# Patient Record
Sex: FEMALE | URBAN - METROPOLITAN AREA
[De-identification: names, ages, dates, MRNs, and addresses within clinical notes are randomized per-mention and may not be internally consistent; named-entity substitution may affect disease eponyms.]

---

## 2017-01-01 ENCOUNTER — EMERGENCY (EMERGENCY)
Facility: HOSPITAL | Age: 72
LOS: 1 days | Discharge: HOME | End: 2017-01-01
Attending: EMERGENCY MEDICINE
Payer: MEDICAID

## 2017-01-01 VITALS
WEIGHT: 191.8 LBS | OXYGEN SATURATION: 98 % | HEIGHT: 57 IN | DIASTOLIC BLOOD PRESSURE: 70 MMHG | RESPIRATION RATE: 16 BRPM | TEMPERATURE: 208 F | SYSTOLIC BLOOD PRESSURE: 125 MMHG | HEART RATE: 78 BPM

## 2017-01-01 VITALS
SYSTOLIC BLOOD PRESSURE: 130 MMHG | RESPIRATION RATE: 17 BRPM | OXYGEN SATURATION: 98 % | TEMPERATURE: 98 F | HEART RATE: 72 BPM | DIASTOLIC BLOOD PRESSURE: 72 MMHG

## 2017-01-01 DIAGNOSIS — Z98.890 OTHER SPECIFIED POSTPROCEDURAL STATES: ICD-10-CM

## 2017-01-01 DIAGNOSIS — I10 ESSENTIAL (PRIMARY) HYPERTENSION: ICD-10-CM

## 2017-01-01 DIAGNOSIS — R19.7 DIARRHEA, UNSPECIFIED: ICD-10-CM

## 2017-01-01 DIAGNOSIS — E78.5 HYPERLIPIDEMIA, UNSPECIFIED: ICD-10-CM

## 2017-01-01 DIAGNOSIS — Z96.651 PRESENCE OF RIGHT ARTIFICIAL KNEE JOINT: ICD-10-CM

## 2017-01-01 DIAGNOSIS — E11.9 TYPE 2 DIABETES MELLITUS WITHOUT COMPLICATIONS: ICD-10-CM

## 2017-01-01 LAB
ALBUMIN SERPL ELPH-MCNC: 3.4 G/DL — LOW (ref 3.5–5)
ALP SERPL-CCNC: 88 U/L — SIGNIFICANT CHANGE UP (ref 40–120)
ALT FLD-CCNC: 21 U/L DA — SIGNIFICANT CHANGE UP (ref 10–60)
AMYLASE P1 CFR SERPL: 41 U/L — SIGNIFICANT CHANGE UP (ref 25–115)
ANION GAP SERPL CALC-SCNC: 7 MMOL/L — SIGNIFICANT CHANGE UP (ref 5–17)
APPEARANCE UR: CLEAR — SIGNIFICANT CHANGE UP
APTT BLD: 33.4 SEC — SIGNIFICANT CHANGE UP (ref 27.5–37.4)
AST SERPL-CCNC: 23 U/L — SIGNIFICANT CHANGE UP (ref 10–40)
BACTERIA # UR AUTO: ABNORMAL /HPF
BASOPHILS # BLD AUTO: 0.1 K/UL — SIGNIFICANT CHANGE UP (ref 0–0.2)
BASOPHILS NFR BLD AUTO: 1.1 % — SIGNIFICANT CHANGE UP (ref 0–2)
BILIRUB SERPL-MCNC: 0.3 MG/DL — SIGNIFICANT CHANGE UP (ref 0.2–1.2)
BILIRUB UR-MCNC: NEGATIVE — SIGNIFICANT CHANGE UP
BLD GP AB SCN SERPL QL: SIGNIFICANT CHANGE UP
BUN SERPL-MCNC: 29 MG/DL — HIGH (ref 7–18)
CALCIUM SERPL-MCNC: 8.5 MG/DL — SIGNIFICANT CHANGE UP (ref 8.4–10.5)
CHLORIDE SERPL-SCNC: 105 MMOL/L — SIGNIFICANT CHANGE UP (ref 96–108)
CO2 SERPL-SCNC: 28 MMOL/L — SIGNIFICANT CHANGE UP (ref 22–31)
COLOR SPEC: YELLOW — SIGNIFICANT CHANGE UP
CREAT SERPL-MCNC: 1.48 MG/DL — HIGH (ref 0.5–1.3)
DIFF PNL FLD: ABNORMAL
EOSINOPHIL # BLD AUTO: 0 K/UL — SIGNIFICANT CHANGE UP (ref 0–0.5)
EOSINOPHIL NFR BLD AUTO: 0.2 % — SIGNIFICANT CHANGE UP (ref 0–6)
EPI CELLS # UR: ABNORMAL (ref 0–10)
GLUCOSE SERPL-MCNC: 100 MG/DL — HIGH (ref 70–99)
GLUCOSE UR QL: NEGATIVE — SIGNIFICANT CHANGE UP
HCT VFR BLD CALC: 39.3 % — SIGNIFICANT CHANGE UP (ref 34.5–45)
HGB BLD-MCNC: 13.5 G/DL — SIGNIFICANT CHANGE UP (ref 11.5–15.5)
INR BLD: 1.12 RATIO — SIGNIFICANT CHANGE UP (ref 0.88–1.16)
KETONES UR-MCNC: NEGATIVE — SIGNIFICANT CHANGE UP
LACTATE SERPL-SCNC: 0.9 MMOL/L — SIGNIFICANT CHANGE UP (ref 0.7–2)
LEUKOCYTE ESTERASE UR-ACNC: ABNORMAL
LIDOCAIN IGE QN: 103 U/L — SIGNIFICANT CHANGE UP (ref 73–393)
LYMPHOCYTES # BLD AUTO: 0.9 K/UL — LOW (ref 1–3.3)
LYMPHOCYTES # BLD AUTO: 11.1 % — LOW (ref 13–44)
MAGNESIUM SERPL-MCNC: 1.4 MG/DL — LOW (ref 1.8–2.4)
MCHC RBC-ENTMCNC: 32.2 PG — SIGNIFICANT CHANGE UP (ref 27–34)
MCHC RBC-ENTMCNC: 34.4 GM/DL — SIGNIFICANT CHANGE UP (ref 32–36)
MCV RBC AUTO: 93.8 FL — SIGNIFICANT CHANGE UP (ref 80–100)
MONOCYTES # BLD AUTO: 0.8 K/UL — SIGNIFICANT CHANGE UP (ref 0–0.9)
MONOCYTES NFR BLD AUTO: 9.2 % — SIGNIFICANT CHANGE UP (ref 2–14)
NEUTROPHILS # BLD AUTO: 6.5 K/UL — SIGNIFICANT CHANGE UP (ref 1.8–7.4)
NEUTROPHILS NFR BLD AUTO: 78.4 % — HIGH (ref 43–77)
NITRITE UR-MCNC: NEGATIVE — SIGNIFICANT CHANGE UP
OB PNL STL: NEGATIVE — SIGNIFICANT CHANGE UP
PH UR: 6 — SIGNIFICANT CHANGE UP (ref 4.8–8)
PLATELET # BLD AUTO: 190 K/UL — SIGNIFICANT CHANGE UP (ref 150–400)
POTASSIUM SERPL-MCNC: 3.5 MMOL/L — SIGNIFICANT CHANGE UP (ref 3.5–5.3)
POTASSIUM SERPL-SCNC: 3.5 MMOL/L — SIGNIFICANT CHANGE UP (ref 3.5–5.3)
PROT SERPL-MCNC: 7.2 G/DL — SIGNIFICANT CHANGE UP (ref 6–8.3)
PROT UR-MCNC: 15
PROTHROM AB SERPL-ACNC: 12.6 SEC — SIGNIFICANT CHANGE UP (ref 10–13.1)
RBC # BLD: 4.2 M/UL — SIGNIFICANT CHANGE UP (ref 3.8–5.2)
RBC # FLD: 11.6 % — SIGNIFICANT CHANGE UP (ref 10.3–14.5)
RBC CASTS # UR COMP ASSIST: SIGNIFICANT CHANGE UP /HPF (ref 0–2)
SODIUM SERPL-SCNC: 140 MMOL/L — SIGNIFICANT CHANGE UP (ref 135–145)
SP GR SPEC: 1.01 — SIGNIFICANT CHANGE UP (ref 1.01–1.02)
TROPONIN I SERPL-MCNC: <0.015 NG/ML — SIGNIFICANT CHANGE UP (ref 0–0.04)
UROBILINOGEN FLD QL: NEGATIVE — SIGNIFICANT CHANGE UP
WBC # BLD: 8.3 K/UL — SIGNIFICANT CHANGE UP (ref 3.8–10.5)
WBC # FLD AUTO: 8.3 K/UL — SIGNIFICANT CHANGE UP (ref 3.8–10.5)
WBC UR QL: ABNORMAL /HPF (ref 0–5)

## 2017-01-01 PROCEDURE — 83605 ASSAY OF LACTIC ACID: CPT

## 2017-01-01 PROCEDURE — 82150 ASSAY OF AMYLASE: CPT

## 2017-01-01 PROCEDURE — 86901 BLOOD TYPING SEROLOGIC RH(D): CPT

## 2017-01-01 PROCEDURE — 99284 EMERGENCY DEPT VISIT MOD MDM: CPT

## 2017-01-01 PROCEDURE — 81001 URINALYSIS AUTO W/SCOPE: CPT

## 2017-01-01 PROCEDURE — 85610 PROTHROMBIN TIME: CPT

## 2017-01-01 PROCEDURE — 74176 CT ABD & PELVIS W/O CONTRAST: CPT

## 2017-01-01 PROCEDURE — 86850 RBC ANTIBODY SCREEN: CPT

## 2017-01-01 PROCEDURE — 74176 CT ABD & PELVIS W/O CONTRAST: CPT | Mod: 26

## 2017-01-01 PROCEDURE — 85730 THROMBOPLASTIN TIME PARTIAL: CPT

## 2017-01-01 PROCEDURE — 36000 PLACE NEEDLE IN VEIN: CPT

## 2017-01-01 PROCEDURE — 85027 COMPLETE CBC AUTOMATED: CPT

## 2017-01-01 PROCEDURE — 83690 ASSAY OF LIPASE: CPT

## 2017-01-01 PROCEDURE — 84484 ASSAY OF TROPONIN QUANT: CPT

## 2017-01-01 PROCEDURE — 80053 COMPREHEN METABOLIC PANEL: CPT

## 2017-01-01 PROCEDURE — 83735 ASSAY OF MAGNESIUM: CPT

## 2017-01-01 PROCEDURE — 82272 OCCULT BLD FECES 1-3 TESTS: CPT

## 2017-01-01 RX ORDER — SODIUM CHLORIDE 9 MG/ML
3 INJECTION INTRAMUSCULAR; INTRAVENOUS; SUBCUTANEOUS ONCE
Qty: 0 | Refills: 0 | Status: COMPLETED | OUTPATIENT
Start: 2017-01-01 | End: 2017-01-01

## 2017-01-01 RX ORDER — LOPERAMIDE HCL 2 MG
4 TABLET ORAL ONCE
Qty: 0 | Refills: 0 | Status: COMPLETED | OUTPATIENT
Start: 2017-01-01 | End: 2017-01-01

## 2017-01-01 RX ORDER — SODIUM CHLORIDE 9 MG/ML
1000 INJECTION INTRAMUSCULAR; INTRAVENOUS; SUBCUTANEOUS
Qty: 0 | Refills: 0 | Status: DISCONTINUED | OUTPATIENT
Start: 2017-01-01 | End: 2017-01-05

## 2017-01-01 RX ORDER — LOPERAMIDE HCL 2 MG
1 TABLET ORAL
Qty: 18 | Refills: 0 | OUTPATIENT
Start: 2017-01-01 | End: 2017-01-04

## 2017-01-01 RX ADMIN — Medication 4 MILLIGRAM(S): at 16:14

## 2017-01-01 RX ADMIN — SODIUM CHLORIDE 125 MILLILITER(S): 9 INJECTION INTRAMUSCULAR; INTRAVENOUS; SUBCUTANEOUS at 13:39

## 2017-01-01 RX ADMIN — SODIUM CHLORIDE 3 MILLILITER(S): 9 INJECTION INTRAMUSCULAR; INTRAVENOUS; SUBCUTANEOUS at 13:39

## 2017-01-01 NOTE — ED PROVIDER NOTE - OBJECTIVE STATEMENT
70 y/o F w/ PMHx of HTN and DM p/w diffuse abd pain and dark diarrhea onset 2 days. Pt recently started taking Motrin for body aches x1 week. Pt denies fever, recent Abx, sick contacts, recent travel, or any other complaint. NKDA.

## 2017-01-01 NOTE — ED PROVIDER NOTE - NS ED MD SCRIBE ATTENDING SCRIBE SECTIONS
PHYSICAL EXAM/HISTORY OF PRESENT ILLNESS/REVIEW OF SYSTEMS/HIV/DISPOSITION/VITAL SIGNS( Pullset)/PAST MEDICAL/SURGICAL/SOCIAL HISTORY

## 2017-01-01 NOTE — ED PROVIDER NOTE - PROGRESS NOTE DETAILS
Pt and family educated on results. Pt reports diarrhea improved. Denies any abd pain. Ate meal w/o difficulty. Wants to go home. Tolerating PO w/o difficulty. Agrees to return for worsening diarrhea, dizziness, pain or any other concerns. Received total 2 liters of fluid.

## 2017-01-01 NOTE — ED PROVIDER NOTE - PMH
Arthritis    Depression    Diabetes    Hyperlipidemia    Hypertension    Insomnia    Obesity (BMI 30-39.9)

## 2017-01-01 NOTE — ED PROVIDER NOTE - PSH
H/O total knee replacement, right  2014 August 12  S/P bilateral cataract extraction    S/P vaginopexy

## 2017-01-01 NOTE — ED ADULT NURSE NOTE - OBJECTIVE STATEMENT
covering rn notes covering for rn agatha: Received pt from triage with c/o abd. pain and diarrhea x 3 days. pt is a/o x 3, cooperative. Denies SOB or chest pain. Awaiting to be seen by MD. Will continue to monitor.

## 2017-09-11 ENCOUNTER — EMERGENCY (EMERGENCY)
Facility: HOSPITAL | Age: 72
LOS: 1 days | Discharge: HOME | End: 2017-09-11
Attending: EMERGENCY MEDICINE
Payer: MEDICAID

## 2017-09-11 VITALS
RESPIRATION RATE: 18 BRPM | WEIGHT: 190.04 LBS | DIASTOLIC BLOOD PRESSURE: 103 MMHG | HEART RATE: 62 BPM | OXYGEN SATURATION: 99 % | HEIGHT: 61 IN | SYSTOLIC BLOOD PRESSURE: 160 MMHG

## 2017-09-11 VITALS
SYSTOLIC BLOOD PRESSURE: 154 MMHG | OXYGEN SATURATION: 99 % | DIASTOLIC BLOOD PRESSURE: 89 MMHG | RESPIRATION RATE: 20 BRPM | HEART RATE: 58 BPM

## 2017-09-11 PROCEDURE — 73562 X-RAY EXAM OF KNEE 3: CPT | Mod: 26,RT

## 2017-09-11 PROCEDURE — 73562 X-RAY EXAM OF KNEE 3: CPT

## 2017-09-11 PROCEDURE — 99284 EMERGENCY DEPT VISIT MOD MDM: CPT

## 2017-09-11 PROCEDURE — 99283 EMERGENCY DEPT VISIT LOW MDM: CPT | Mod: 25

## 2017-09-11 RX ORDER — OXYCODONE AND ACETAMINOPHEN 5; 325 MG/1; MG/1
1 TABLET ORAL ONCE
Qty: 0 | Refills: 0 | Status: DISCONTINUED | OUTPATIENT
Start: 2017-09-11 | End: 2017-09-11

## 2017-09-11 RX ADMIN — OXYCODONE AND ACETAMINOPHEN 1 TABLET(S): 5; 325 TABLET ORAL at 10:24

## 2017-09-11 NOTE — ED PROVIDER NOTE - OBJECTIVE STATEMENT
71 y/o F pt with PMHx of Arthritis, Depression, DM, HLD, HTN, Insomnia, and Obesity and PSHx of Vaginopexy, b/l Cataract Extraction, and TKR (R) presents to ED c/o R knee pain since yesterday. Pt also reports associated difficulty ambulating, prompting pt to visit ED for evaluation. Pt denies numbness, tingling, or any other complaints. Pt also denies recent trauma to R knee. NKDA.

## 2017-09-15 DIAGNOSIS — E11.9 TYPE 2 DIABETES MELLITUS WITHOUT COMPLICATIONS: ICD-10-CM

## 2017-09-15 DIAGNOSIS — G47.00 INSOMNIA, UNSPECIFIED: ICD-10-CM

## 2017-09-15 DIAGNOSIS — F32.89 OTHER SPECIFIED DEPRESSIVE EPISODES: ICD-10-CM
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
## 2017-09-15 DIAGNOSIS — Z96.651 PRESENCE OF RIGHT ARTIFICIAL KNEE JOINT: ICD-10-CM

## 2017-09-15 DIAGNOSIS — M19.90 UNSPECIFIED OSTEOARTHRITIS, UNSPECIFIED SITE: ICD-10-CM

## 2017-09-15 DIAGNOSIS — S83.8X1A SPRAIN OF OTHER SPECIFIED PARTS OF RIGHT KNEE, INITIAL ENCOUNTER: ICD-10-CM
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
## 2017-09-15 DIAGNOSIS — E78.5 HYPERLIPIDEMIA, UNSPECIFIED: ICD-10-CM

## 2017-09-15 DIAGNOSIS — E66.9 OBESITY, UNSPECIFIED: ICD-10-CM

## 2017-09-15 DIAGNOSIS — I10 ESSENTIAL (PRIMARY) HYPERTENSION: ICD-10-CM

## 2017-10-11 ENCOUNTER — OUTPATIENT (OUTPATIENT)
Dept: OUTPATIENT SERVICES | Facility: HOSPITAL | Age: 72
LOS: 1 days | End: 2017-10-11
Payer: MEDICAID

## 2017-10-11 VITALS
HEART RATE: 78 BPM | WEIGHT: 199.96 LBS | OXYGEN SATURATION: 98 % | SYSTOLIC BLOOD PRESSURE: 150 MMHG | TEMPERATURE: 99 F | DIASTOLIC BLOOD PRESSURE: 80 MMHG | RESPIRATION RATE: 18 BRPM | HEIGHT: 56 IN

## 2017-10-11 DIAGNOSIS — M17.12 UNILATERAL PRIMARY OSTEOARTHRITIS, LEFT KNEE: ICD-10-CM

## 2017-10-11 DIAGNOSIS — G47.33 OBSTRUCTIVE SLEEP APNEA (ADULT) (PEDIATRIC): ICD-10-CM

## 2017-10-11 DIAGNOSIS — I10 ESSENTIAL (PRIMARY) HYPERTENSION: ICD-10-CM

## 2017-10-11 DIAGNOSIS — E11.9 TYPE 2 DIABETES MELLITUS WITHOUT COMPLICATIONS: ICD-10-CM

## 2017-10-11 DIAGNOSIS — Z01.818 ENCOUNTER FOR OTHER PREPROCEDURAL EXAMINATION: ICD-10-CM

## 2017-10-11 LAB
ANION GAP SERPL CALC-SCNC: 7 MMOL/L — SIGNIFICANT CHANGE UP (ref 5–17)
APTT BLD: 34.4 SEC — SIGNIFICANT CHANGE UP (ref 27.5–37.4)
BUN SERPL-MCNC: 28 MG/DL — HIGH (ref 7–18)
CALCIUM SERPL-MCNC: 9.9 MG/DL — SIGNIFICANT CHANGE UP (ref 8.4–10.5)
CHLORIDE SERPL-SCNC: 108 MMOL/L — SIGNIFICANT CHANGE UP (ref 96–108)
CO2 SERPL-SCNC: 25 MMOL/L — SIGNIFICANT CHANGE UP (ref 22–31)
CREAT SERPL-MCNC: 1.21 MG/DL — SIGNIFICANT CHANGE UP (ref 0.5–1.3)
GLUCOSE SERPL-MCNC: 113 MG/DL — HIGH (ref 70–99)
HBA1C BLD-MCNC: 6.1 % — HIGH (ref 4–5.6)
HCT VFR BLD CALC: 41.2 % — SIGNIFICANT CHANGE UP (ref 34.5–45)
HGB BLD-MCNC: 13.6 G/DL — SIGNIFICANT CHANGE UP (ref 11.5–15.5)
INR BLD: 0.99 RATIO — SIGNIFICANT CHANGE UP (ref 0.88–1.16)
MCHC RBC-ENTMCNC: 32.7 PG — SIGNIFICANT CHANGE UP (ref 27–34)
MCHC RBC-ENTMCNC: 33 GM/DL — SIGNIFICANT CHANGE UP (ref 32–36)
MCV RBC AUTO: 99.3 FL — SIGNIFICANT CHANGE UP (ref 80–100)
PLATELET # BLD AUTO: 240 K/UL — SIGNIFICANT CHANGE UP (ref 150–400)
POTASSIUM SERPL-MCNC: 3.9 MMOL/L — SIGNIFICANT CHANGE UP (ref 3.5–5.3)
POTASSIUM SERPL-SCNC: 3.9 MMOL/L — SIGNIFICANT CHANGE UP (ref 3.5–5.3)
PROTHROM AB SERPL-ACNC: 10.8 SEC — SIGNIFICANT CHANGE UP (ref 9.8–12.7)
RBC # BLD: 4.15 M/UL — SIGNIFICANT CHANGE UP (ref 3.8–5.2)
RBC # FLD: 11.6 % — SIGNIFICANT CHANGE UP (ref 10.3–14.5)
SODIUM SERPL-SCNC: 140 MMOL/L — SIGNIFICANT CHANGE UP (ref 135–145)
WBC # BLD: 5.6 K/UL — SIGNIFICANT CHANGE UP (ref 3.8–10.5)
WBC # FLD AUTO: 5.6 K/UL — SIGNIFICANT CHANGE UP (ref 3.8–10.5)

## 2017-10-11 PROCEDURE — 71020: CPT | Mod: 26

## 2017-10-11 PROCEDURE — G0463: CPT

## 2017-10-11 PROCEDURE — 83036 HEMOGLOBIN GLYCOSYLATED A1C: CPT

## 2017-10-11 PROCEDURE — 87640 STAPH A DNA AMP PROBE: CPT

## 2017-10-11 PROCEDURE — 85730 THROMBOPLASTIN TIME PARTIAL: CPT

## 2017-10-11 PROCEDURE — 85027 COMPLETE CBC AUTOMATED: CPT

## 2017-10-11 PROCEDURE — 86900 BLOOD TYPING SEROLOGIC ABO: CPT

## 2017-10-11 PROCEDURE — 85610 PROTHROMBIN TIME: CPT

## 2017-10-11 PROCEDURE — 71046 X-RAY EXAM CHEST 2 VIEWS: CPT

## 2017-10-11 PROCEDURE — 80048 BASIC METABOLIC PNL TOTAL CA: CPT

## 2017-10-11 PROCEDURE — 87641 MR-STAPH DNA AMP PROBE: CPT

## 2017-10-11 PROCEDURE — 93005 ELECTROCARDIOGRAM TRACING: CPT

## 2017-10-11 PROCEDURE — 86850 RBC ANTIBODY SCREEN: CPT

## 2017-10-11 PROCEDURE — 86901 BLOOD TYPING SEROLOGIC RH(D): CPT

## 2017-10-11 RX ORDER — HYDROXYZINE HCL 10 MG
1 TABLET ORAL
Qty: 0 | Refills: 0 | COMMUNITY

## 2017-10-11 NOTE — H&P PST ADULT - PMH
Arthritis    CVA (cerebral vascular accident)  facial weakness  2011  Depression    Diabetes  x 4 years, controlled  Hyperlipidemia    Hypertension  x 25 years, controlled  Hypothyroid    Insomnia    OA (osteoarthritis)    Obesity (BMI 30-39.9) Arthritis    CVA (cerebral vascular accident)  facial weakness  2011  Depression    Diabetes  x 4 years, controlled  Hyperlipidemia    Hypertension  x 25 years, controlled  Hypothyroid    Insomnia    Morbid obesity with BMI of 40.0-44.9, adult    OA (osteoarthritis)    CHRISTOPHE (obstructive sleep apnea)  by criteria

## 2017-10-11 NOTE — H&P PST ADULT - NSANTHOSAYNRD_GEN_A_CORE
No. CHRISTOPHE screening performed.  STOP BANG Legend: 0-2 = LOW Risk; 3-4 = INTERMEDIATE Risk; 5-8 = HIGH Risk

## 2017-10-11 NOTE — H&P PST ADULT - FAMILY HISTORY
Mother  Still living? No  Family history of essential hypertension, Age at diagnosis: Age Unknown  Family history of heart disease, Age at diagnosis: Age Unknown     Father  Still living? Unknown  Family history of heart disease, Age at diagnosis: Age Unknown

## 2017-10-11 NOTE — H&P PST ADULT - HISTORY OF PRESENT ILLNESS
This is a 73 y/o female c/o pain left knee associated with difficulty walking, Xray revealed osteoarthritis, she presents today for surgery

## 2017-10-12 LAB
MRSA PCR RESULT.: SIGNIFICANT CHANGE UP
S AUREUS DNA NOSE QL NAA+PROBE: SIGNIFICANT CHANGE UP

## 2017-10-24 ENCOUNTER — INPATIENT (INPATIENT)
Facility: HOSPITAL | Age: 72
LOS: 3 days | Discharge: HOME HEALTH | DRG: 464 | End: 2017-10-28
Attending: ORTHOPAEDIC SURGERY | Admitting: ORTHOPAEDIC SURGERY
Payer: MEDICAID

## 2017-10-24 VITALS
RESPIRATION RATE: 18 BRPM | HEIGHT: 56 IN | DIASTOLIC BLOOD PRESSURE: 76 MMHG | WEIGHT: 199.96 LBS | HEART RATE: 65 BPM | TEMPERATURE: 98 F | OXYGEN SATURATION: 97 % | SYSTOLIC BLOOD PRESSURE: 141 MMHG

## 2017-10-24 DIAGNOSIS — M17.12 UNILATERAL PRIMARY OSTEOARTHRITIS, LEFT KNEE: ICD-10-CM

## 2017-10-24 DIAGNOSIS — E78.5 HYPERLIPIDEMIA, UNSPECIFIED: ICD-10-CM

## 2017-10-24 DIAGNOSIS — F41.0 PANIC DISORDER [EPISODIC PAROXYSMAL ANXIETY]: ICD-10-CM

## 2017-10-24 DIAGNOSIS — Z29.9 ENCOUNTER FOR PROPHYLACTIC MEASURES, UNSPECIFIED: ICD-10-CM

## 2017-10-24 LAB
ANION GAP SERPL CALC-SCNC: 13 MMOL/L — SIGNIFICANT CHANGE UP (ref 5–17)
BUN SERPL-MCNC: 25 MG/DL — HIGH (ref 7–18)
CALCIUM SERPL-MCNC: 9.1 MG/DL — SIGNIFICANT CHANGE UP (ref 8.4–10.5)
CHLORIDE SERPL-SCNC: 105 MMOL/L — SIGNIFICANT CHANGE UP (ref 96–108)
CO2 SERPL-SCNC: 21 MMOL/L — LOW (ref 22–31)
CREAT SERPL-MCNC: 1.44 MG/DL — HIGH (ref 0.5–1.3)
GLUCOSE BLDC GLUCOMTR-MCNC: 142 MG/DL — HIGH (ref 70–99)
GLUCOSE BLDC GLUCOMTR-MCNC: 151 MG/DL — HIGH (ref 70–99)
GLUCOSE SERPL-MCNC: 163 MG/DL — HIGH (ref 70–99)
HCT VFR BLD CALC: 36.9 % — SIGNIFICANT CHANGE UP (ref 34.5–45)
HGB BLD-MCNC: 12.2 G/DL — SIGNIFICANT CHANGE UP (ref 11.5–15.5)
MAGNESIUM SERPL-MCNC: 1.3 MG/DL — LOW (ref 1.6–2.6)
MCHC RBC-ENTMCNC: 33.1 GM/DL — SIGNIFICANT CHANGE UP (ref 32–36)
MCHC RBC-ENTMCNC: 33.3 PG — SIGNIFICANT CHANGE UP (ref 27–34)
MCV RBC AUTO: 100.4 FL — HIGH (ref 80–100)
PHOSPHATE SERPL-MCNC: 3.4 MG/DL — SIGNIFICANT CHANGE UP (ref 2.5–4.5)
PLATELET # BLD AUTO: 211 K/UL — SIGNIFICANT CHANGE UP (ref 150–400)
POTASSIUM SERPL-MCNC: 3.7 MMOL/L — SIGNIFICANT CHANGE UP (ref 3.5–5.3)
POTASSIUM SERPL-SCNC: 3.7 MMOL/L — SIGNIFICANT CHANGE UP (ref 3.5–5.3)
RBC # BLD: 3.67 M/UL — LOW (ref 3.8–5.2)
RBC # FLD: 11.9 % — SIGNIFICANT CHANGE UP (ref 10.3–14.5)
SODIUM SERPL-SCNC: 139 MMOL/L — SIGNIFICANT CHANGE UP (ref 135–145)
WBC # BLD: 9 K/UL — SIGNIFICANT CHANGE UP (ref 3.8–10.5)
WBC # FLD AUTO: 9 K/UL — SIGNIFICANT CHANGE UP (ref 3.8–10.5)

## 2017-10-24 PROCEDURE — 88311 DECALCIFY TISSUE: CPT | Mod: 26

## 2017-10-24 PROCEDURE — 27447 TOTAL KNEE ARTHROPLASTY: CPT | Mod: AS,LT

## 2017-10-24 PROCEDURE — 27339 EXC THIGH/KNEE TUM DEP 5CM/>: CPT | Mod: AS,59

## 2017-10-24 PROCEDURE — 73560 X-RAY EXAM OF KNEE 1 OR 2: CPT | Mod: 26

## 2017-10-24 PROCEDURE — 20900 REMOVAL OF BONE FOR GRAFT: CPT | Mod: AS

## 2017-10-24 PROCEDURE — 73562 X-RAY EXAM OF KNEE 3: CPT | Mod: 26,LT

## 2017-10-24 PROCEDURE — 88305 TISSUE EXAM BY PATHOLOGIST: CPT | Mod: 26

## 2017-10-24 RX ORDER — HYDROXYZINE HCL 10 MG
25 TABLET ORAL ONCE
Qty: 0 | Refills: 0 | Status: COMPLETED | OUTPATIENT
Start: 2017-10-24 | End: 2017-10-24

## 2017-10-24 RX ORDER — DEXTROSE 50 % IN WATER 50 %
25 SYRINGE (ML) INTRAVENOUS ONCE
Qty: 0 | Refills: 0 | Status: DISCONTINUED | OUTPATIENT
Start: 2017-10-24 | End: 2017-10-28

## 2017-10-24 RX ORDER — HYDROMORPHONE HYDROCHLORIDE 2 MG/ML
30 INJECTION INTRAMUSCULAR; INTRAVENOUS; SUBCUTANEOUS
Qty: 0 | Refills: 0 | Status: DISCONTINUED | OUTPATIENT
Start: 2017-10-24 | End: 2017-10-25

## 2017-10-24 RX ORDER — POTASSIUM CHLORIDE 20 MEQ
20 PACKET (EA) ORAL ONCE
Qty: 0 | Refills: 0 | Status: COMPLETED | OUTPATIENT
Start: 2017-10-24 | End: 2017-10-24

## 2017-10-24 RX ORDER — DOCUSATE SODIUM 100 MG
100 CAPSULE ORAL THREE TIMES A DAY
Qty: 0 | Refills: 0 | Status: DISCONTINUED | OUTPATIENT
Start: 2017-10-24 | End: 2017-10-28

## 2017-10-24 RX ORDER — POLYETHYLENE GLYCOL 3350 17 G/17G
17 POWDER, FOR SOLUTION ORAL DAILY
Qty: 0 | Refills: 0 | Status: DISCONTINUED | OUTPATIENT
Start: 2017-10-24 | End: 2017-10-28

## 2017-10-24 RX ORDER — ACETAMINOPHEN 500 MG
1000 TABLET ORAL ONCE
Qty: 0 | Refills: 0 | Status: COMPLETED | OUTPATIENT
Start: 2017-10-25 | End: 2017-10-25

## 2017-10-24 RX ORDER — ONDANSETRON 8 MG/1
4 TABLET, FILM COATED ORAL EVERY 6 HOURS
Qty: 0 | Refills: 0 | Status: DISCONTINUED | OUTPATIENT
Start: 2017-10-24 | End: 2017-10-28

## 2017-10-24 RX ORDER — FOLIC ACID 0.8 MG
1 TABLET ORAL DAILY
Qty: 0 | Refills: 0 | Status: DISCONTINUED | OUTPATIENT
Start: 2017-10-24 | End: 2017-10-28

## 2017-10-24 RX ORDER — ACETAMINOPHEN 500 MG
650 TABLET ORAL EVERY 6 HOURS
Qty: 0 | Refills: 0 | Status: DISCONTINUED | OUTPATIENT
Start: 2017-10-24 | End: 2017-10-28

## 2017-10-24 RX ORDER — GLUCAGON INJECTION, SOLUTION 0.5 MG/.1ML
1 INJECTION, SOLUTION SUBCUTANEOUS ONCE
Qty: 0 | Refills: 0 | Status: DISCONTINUED | OUTPATIENT
Start: 2017-10-24 | End: 2017-10-28

## 2017-10-24 RX ORDER — METOPROLOL TARTRATE 50 MG
25 TABLET ORAL DAILY
Qty: 0 | Refills: 0 | Status: DISCONTINUED | OUTPATIENT
Start: 2017-10-24 | End: 2017-10-28

## 2017-10-24 RX ORDER — ENOXAPARIN SODIUM 100 MG/ML
30 INJECTION SUBCUTANEOUS EVERY 12 HOURS
Qty: 0 | Refills: 0 | Status: DISCONTINUED | OUTPATIENT
Start: 2017-10-24 | End: 2017-10-28

## 2017-10-24 RX ORDER — ONDANSETRON 8 MG/1
4 TABLET, FILM COATED ORAL EVERY 6 HOURS
Qty: 0 | Refills: 0 | Status: DISCONTINUED | OUTPATIENT
Start: 2017-10-24 | End: 2017-10-25

## 2017-10-24 RX ORDER — LOSARTAN POTASSIUM 100 MG/1
100 TABLET, FILM COATED ORAL DAILY
Qty: 0 | Refills: 0 | Status: DISCONTINUED | OUTPATIENT
Start: 2017-10-24 | End: 2017-10-28

## 2017-10-24 RX ORDER — BUPIVACAINE 13.3 MG/ML
20 INJECTION, SUSPENSION, LIPOSOMAL INFILTRATION ONCE
Qty: 0 | Refills: 0 | Status: COMPLETED | OUTPATIENT
Start: 2017-10-24 | End: 2017-10-24

## 2017-10-24 RX ORDER — ATORVASTATIN CALCIUM 80 MG/1
20 TABLET, FILM COATED ORAL AT BEDTIME
Qty: 0 | Refills: 0 | Status: DISCONTINUED | OUTPATIENT
Start: 2017-10-24 | End: 2017-10-28

## 2017-10-24 RX ORDER — ASPIRIN/CALCIUM CARB/MAGNESIUM 324 MG
81 TABLET ORAL DAILY
Qty: 0 | Refills: 0 | Status: DISCONTINUED | OUTPATIENT
Start: 2017-10-24 | End: 2017-10-28

## 2017-10-24 RX ORDER — ASCORBIC ACID 60 MG
500 TABLET,CHEWABLE ORAL
Qty: 0 | Refills: 0 | Status: DISCONTINUED | OUTPATIENT
Start: 2017-10-24 | End: 2017-10-28

## 2017-10-24 RX ORDER — MAGNESIUM HYDROXIDE 400 MG/1
30 TABLET, CHEWABLE ORAL DAILY
Qty: 0 | Refills: 0 | Status: DISCONTINUED | OUTPATIENT
Start: 2017-10-24 | End: 2017-10-28

## 2017-10-24 RX ORDER — CEFAZOLIN SODIUM 1 G
1000 VIAL (EA) INJECTION EVERY 8 HOURS
Qty: 0 | Refills: 0 | Status: COMPLETED | OUTPATIENT
Start: 2017-10-24 | End: 2017-10-25

## 2017-10-24 RX ORDER — SODIUM CHLORIDE 9 MG/ML
3 INJECTION INTRAMUSCULAR; INTRAVENOUS; SUBCUTANEOUS EVERY 8 HOURS
Qty: 0 | Refills: 0 | Status: DISCONTINUED | OUTPATIENT
Start: 2017-10-24 | End: 2017-10-24

## 2017-10-24 RX ORDER — AMLODIPINE BESYLATE 2.5 MG/1
10 TABLET ORAL DAILY
Qty: 0 | Refills: 0 | Status: DISCONTINUED | OUTPATIENT
Start: 2017-10-24 | End: 2017-10-28

## 2017-10-24 RX ORDER — SODIUM CHLORIDE 9 MG/ML
1000 INJECTION, SOLUTION INTRAVENOUS
Qty: 0 | Refills: 0 | Status: DISCONTINUED | OUTPATIENT
Start: 2017-10-24 | End: 2017-10-28

## 2017-10-24 RX ORDER — ZALEPLON 10 MG
5 CAPSULE ORAL ONCE
Qty: 0 | Refills: 0 | Status: DISCONTINUED | OUTPATIENT
Start: 2017-10-24 | End: 2017-10-24

## 2017-10-24 RX ORDER — INSULIN LISPRO 100/ML
VIAL (ML) SUBCUTANEOUS
Qty: 0 | Refills: 0 | Status: DISCONTINUED | OUTPATIENT
Start: 2017-10-24 | End: 2017-10-28

## 2017-10-24 RX ORDER — METFORMIN HYDROCHLORIDE 850 MG/1
850 TABLET ORAL DAILY
Qty: 0 | Refills: 0 | Status: DISCONTINUED | OUTPATIENT
Start: 2017-10-24 | End: 2017-10-24

## 2017-10-24 RX ORDER — DOCUSATE SODIUM 100 MG
100 CAPSULE ORAL
Qty: 0 | Refills: 0 | Status: DISCONTINUED | OUTPATIENT
Start: 2017-10-24 | End: 2017-10-24

## 2017-10-24 RX ORDER — NALOXONE HYDROCHLORIDE 4 MG/.1ML
0.1 SPRAY NASAL
Qty: 0 | Refills: 0 | Status: DISCONTINUED | OUTPATIENT
Start: 2017-10-24 | End: 2017-10-25

## 2017-10-24 RX ORDER — HYDROXYZINE HCL 10 MG
25 TABLET ORAL
Qty: 0 | Refills: 0 | Status: DISCONTINUED | OUTPATIENT
Start: 2017-10-24 | End: 2017-10-28

## 2017-10-24 RX ADMIN — Medication 5 MILLIGRAM(S): at 22:04

## 2017-10-24 RX ADMIN — Medication 100 MILLIGRAM(S): at 21:20

## 2017-10-24 RX ADMIN — HYDROMORPHONE HYDROCHLORIDE 30 MILLILITER(S): 2 INJECTION INTRAMUSCULAR; INTRAVENOUS; SUBCUTANEOUS at 14:54

## 2017-10-24 RX ADMIN — HYDROMORPHONE HYDROCHLORIDE 30 MILLILITER(S): 2 INJECTION INTRAMUSCULAR; INTRAVENOUS; SUBCUTANEOUS at 12:18

## 2017-10-24 RX ADMIN — ATORVASTATIN CALCIUM 20 MILLIGRAM(S): 80 TABLET, FILM COATED ORAL at 21:20

## 2017-10-24 RX ADMIN — HYDROMORPHONE HYDROCHLORIDE 30 MILLILITER(S): 2 INJECTION INTRAMUSCULAR; INTRAVENOUS; SUBCUTANEOUS at 19:20

## 2017-10-24 RX ADMIN — ENOXAPARIN SODIUM 30 MILLIGRAM(S): 100 INJECTION SUBCUTANEOUS at 17:59

## 2017-10-24 RX ADMIN — Medication 20 MILLIEQUIVALENT(S): at 17:59

## 2017-10-24 RX ADMIN — Medication 500 MILLIGRAM(S): at 17:59

## 2017-10-24 RX ADMIN — Medication 100 MILLIGRAM(S): at 17:59

## 2017-10-24 RX ADMIN — Medication 25 MILLIGRAM(S): at 20:36

## 2017-10-24 NOTE — BRIEF OPERATIVE NOTE - PROCEDURE
<<-----Click on this checkbox to enter Procedure TKA (total knee arthroplasty)  10/24/2017  left  Active  SPILLAI

## 2017-10-24 NOTE — PATIENT PROFILE ADULT. - TYPE OF ADMISSION, PATIENT PROFILE
Infusion Nursing Note:  Rachele Martínez presents today for Sandostatin.    Patient seen by provider today: No   present during visit today: Not Applicable.    Note: N/A.    Intravenous Access:  No Intravenous access/labs at this visit.    Treatment Conditions:  Not Applicable.      Post Infusion Assessment:  Patient tolerated injection without incident.    Discharge Plan:   6/8 as scheduled for sandostatin.    ADAM GUO RN                         MD Office/Clinic

## 2017-10-24 NOTE — CONSULT NOTE ADULT - PROBLEM SELECTOR RECOMMENDATION 2
S/P TKA  Ortho follow up  DVT PPX  pain control  incentive spirometry  Pt/Rehab S/P TKA POD #0  Ortho follow up  DVT PPX  pain control  incentive spirometry  Pt/Rehab

## 2017-10-24 NOTE — CONSULT NOTE ADULT - PROBLEM SELECTOR RECOMMENDATION 4
Monitor BP  Continue meds Monitor BP  Continue current cardiac meds  - Dr. Cole consulted as per surgery

## 2017-10-24 NOTE — PHYSICAL THERAPY INITIAL EVALUATION ADULT - GENERAL OBSERVATIONS, REHAB EVAL
Consult received, chart reviewed. Patient received supine in bed, NAD, +PCA/IV. +hemovac, +bray. Patient agreed to EVALUATION from Physical Therapist.

## 2017-10-24 NOTE — PHYSICAL THERAPY INITIAL EVALUATION ADULT - ACTIVE RANGE OF MOTION EXAMINATION, REHAB EVAL
bilateral  lower extremity Active ROM was WFL (within functional limits)/bilateral upper extremity Active ROM was WFL (within functional limits)/Lt. knee 0-80 c/d/i

## 2017-10-24 NOTE — PATIENT PROFILE ADULT. - PMH
Arthritis    CVA (cerebral vascular accident)  facial weakness  2011  Depression    Diabetes  x 4 years, controlled  Hyperlipidemia    Hypertension  x 25 years, controlled  Hypothyroid    Insomnia    Morbid obesity with BMI of 40.0-44.9, adult    OA (osteoarthritis)    CHRISTOPHE (obstructive sleep apnea)  by criteria

## 2017-10-24 NOTE — CONSULT NOTE ADULT - SUBJECTIVE AND OBJECTIVE BOX
Patient is a 72y old  Female w/ PMHx of Arthritis, CVA w/ facial weakness in 2011, Depression  Diabetes  x 4 years, controlled, HTN x 25 years, Hypothyroid , Insomnia, Morbid obesity,  CHRISTOPHE by criteria who presents with a chief complaint of "left knee pain". Pt underwent L TKA POD # 0. Medicine was consulted for pt being very anxious, unable to calm down. Pt takes ambien 10mg at home, and states that she has had previous panic attacks as well.      PAST MEDICAL & SURGICAL HISTORY:  CHRISTOPHE (obstructive sleep apnea): by criteria  Morbid obesity with BMI of 40.0-44.9, adult  OA (osteoarthritis)  CVA (cerebral vascular accident): facial weakness  2011  Hypothyroid  Arthritis  Insomnia  Depression  Hyperlipidemia  Hypertension: x 25 years, controlled  Diabetes: x 4 years, controlled  H/O total knee replacement, right: 2014 August 12  S/P bilateral cataract extraction  S/P vaginopexy      FAMILY HISTORY:  Family history of heart disease (Mother, Father)  Family history of essential hypertension (Mother)    Review of Systems:  CONSTITUTIONAL: No fever, chills, or fatigue  EYES: No eye pain, visual disturbances, or discharge  ENMT:  No difficulty hearing, tinnitus, vertigo; No sinus or throat pain  NECK: No pain or stiffness  RESPIRATORY: No cough, wheezing, chills or hemoptysis; No shortness of breath  CARDIOVASCULAR: No chest pain, palpitations, dizziness, or leg swelling  GASTROINTESTINAL: No abdominal or epigastric pain. No nausea, vomiting, or hematemesis; No diarrhea or constipation. No melena or hematochezia.  GENITOURINARY: No dysuria, frequency, hematuria, or incontinence  NEUROLOGICAL: No headaches, memory loss, loss of strength, numbness, or tremors  SKIN: No itching, burning, rashes, or lesions   MUSCULOSKELETAL: No joint pain or swelling; No muscle, back, or extremity pain  PSYCHIATRIC: No depression, anxiety, mood swings, or difficulty sleeping      Medications:  acetaminophen   Tablet 650 milliGRAM(s) Oral every 6 hours PRN  aluminum hydroxide/magnesium hydroxide/simethicone Suspension 30 milliLiter(s) Oral four times a day PRN  amLODIPine   Tablet 10 milliGRAM(s) Oral daily  ascorbic acid 500 milliGRAM(s) Oral two times a day  aspirin  chewable 81 milliGRAM(s) Oral daily  atorvastatin 20 milliGRAM(s) Oral at bedtime  ceFAZolin   IVPB 1000 milliGRAM(s) IV Intermittent every 8 hours  dextrose 5%. 1000 milliLiter(s) IV Continuous <Continuous>  dextrose 50% Injectable 25 Gram(s) IV Push once  dextrose 50% Injectable 25 Gram(s) IV Push once  docusate sodium 100 milliGRAM(s) Oral three times a day  enoxaparin Injectable 30 milliGRAM(s) SubCutaneous every 12 hours  folic acid 1 milliGRAM(s) Oral daily  glucagon  Injectable 1 milliGRAM(s) IntraMuscular once PRN  HYDROmorphone PCA (1 mG/mL) 30 milliLiter(s) PCA Continuous PCA Continuous  hydrOXYzine hydrochloride 25 milliGRAM(s) Oral two times a day  insulin lispro (HumaLOG) corrective regimen sliding scale   SubCutaneous three times a day before meals  losartan 100 milliGRAM(s) Oral daily  magnesium hydroxide Suspension 30 milliLiter(s) Oral daily PRN  metoprolol 25 milliGRAM(s) Oral daily  naloxone Injectable 0.1 milliGRAM(s) IV Push every 3 minutes PRN  ondansetron Injectable 4 milliGRAM(s) IV Push every 6 hours PRN  ondansetron Injectable 4 milliGRAM(s) IV Push every 6 hours PRN  PARoxetine 10 milliGRAM(s) Oral daily  polyethylene glycol 3350 17 Gram(s) Oral daily  sodium chloride 0.45%. 1000 milliLiter(s) IV Continuous <Continuous>        Vital Signs Last 24 Hrs  T(C): 37.2 (24 Oct 2017 21:24), Max: 37.2 (24 Oct 2017 21:24)  T(F): 98.9 (24 Oct 2017 21:24), Max: 98.9 (24 Oct 2017 21:24)  HR: 78 (24 Oct 2017 21:24) (65 - 101)  BP: 125/54 (24 Oct 2017 21:24) (98/78 - 141/76)  BP(mean): 85 (24 Oct 2017 14:20) (74 - 101)  RR: 18 (24 Oct 2017 21:24) (16 - 26)  SpO2: 100% (24 Oct 2017 21:24) (92% - 100%)            Physical Examination:    General: No acute distress.      HEENT: Pupils equal, reactive to light.  Symmetric.    PULM: Clear to auscultation bilaterally, no significant sputum production    CVS: Regular rate and rhythm, no murmurs, rubs, or gallops    ABD: Soft, nondistended, nontender, normoactive bowel sounds, no masses    EXT: No edema, nontender    SKIN: Warm and well perfused, no rashes noted.    NEURO: Alert, oriented, interactive, nonfocal      LABS:                        12.2   9.0   )-----------( 211      ( 24 Oct 2017 13:22 )             36.9     10-24    139  |  105  |  25<H>  ----------------------------<  163<H>  3.7   |  21<L>  |  1.44<H>    Ca    9.1      24 Oct 2017 13:22  Phos  3.4     10-24  Mg     1.3     10-24            CAPILLARY BLOOD GLUCOSE  105 (24 Oct 2017 08:29)      POCT Blood Glucose.: 151 mg/dL (24 Oct 2017 22:04)          RADIOLOGY REVIEWED ***    CRITICAL CARE TIME SPENT: 35 minutes

## 2017-10-24 NOTE — CONSULT NOTE ADULT - SUBJECTIVE AND OBJECTIVE BOX
PULMONARY CONSULT NOTE      ERICA GONZALEZ  [id number]    Patient is a 72y old  Female who presents with a chief complaint of "knee pain" (24 Oct 2017 08:11)      HISTORY OF PRESENT ILLNESS:    MEDICATIONS  (STANDING):  BUpivacaine liposome 1.3% Injectable 20 milliLiter(s) Local Injection once  dextrose 5%. 1000 milliLiter(s) (50 mL/Hr) IV Continuous <Continuous>  dextrose 50% Injectable 25 Gram(s) IV Push once  dextrose 50% Injectable 25 Gram(s) IV Push once  HYDROmorphone PCA (1 mG/mL) 30 milliLiter(s) PCA Continuous PCA Continuous  insulin lispro (HumaLOG) corrective regimen sliding scale   SubCutaneous three times a day before meals  potassium chloride    Tablet ER 20 milliEquivalent(s) Oral once      MEDICATIONS  (PRN):  glucagon  Injectable 1 milliGRAM(s) IntraMuscular once PRN Glucose LESS THAN 70 milligrams/deciliter  naloxone Injectable 0.1 milliGRAM(s) IV Push every 3 minutes PRN For ANY of the following changes in patient status:  A. RR LESS THAN 10 breaths per minute, B. Oxygen saturation LESS THAN 90%, C. Sedation score of 6  ondansetron Injectable 4 milliGRAM(s) IV Push every 6 hours PRN Nausea      Allergies    No Known Allergies    Intolerances        PAST MEDICAL & SURGICAL HISTORY:  CHRISTOPHE (obstructive sleep apnea): by criteria  Morbid obesity with BMI of 40.0-44.9, adult  OA (osteoarthritis)  CVA (cerebral vascular accident): facial weakness  2011  Hypothyroid  Arthritis  Insomnia  Depression  Hyperlipidemia  Hypertension: x 25 years, controlled  Diabetes: x 4 years, controlled  H/O total knee replacement, right: 2014 August 12  S/P bilateral cataract extraction  S/P vaginopexy      FAMILY HISTORY:  Family history of heart disease (Mother, Father)  Family history of essential hypertension (Mother)      SOCIAL HISTORY  Smoking History:     REVIEW OF SYSTEMS:    CONSTITUTIONAL:  No fevers, chills, sweats    HEENT:  Eyes:  No diplopia or blurred vision. ENT:  No earache, sore throat or runny nose.    CARDIOVASCULAR:  No pressure, squeezing, tightness, or heaviness about the chest; no palpitations.    RESPIRATORY:  Per HPI    GASTROINTESTINAL:  No abdominal pain, nausea, vomiting or diarrhea.    GENITOURINARY:  No dysuria, frequency or urgency.    NEUROLOGIC:  No paresthesias, fasciculations, seizures or weakness.    PSYCHIATRIC:  No disorder of thought or mood.    Vital Signs Last 24 Hrs  T(C): 36.9 (24 Oct 2017 14:20), Max: 36.9 (24 Oct 2017 14:20)  T(F): 98.5 (24 Oct 2017 14:20), Max: 98.5 (24 Oct 2017 14:20)  HR: 90 (24 Oct 2017 14:20) (65 - 90)  BP: 126/67 (24 Oct 2017 14:20) (98/78 - 141/76)  BP(mean): 85 (24 Oct 2017 14:20) (74 - 101)  RR: 17 (24 Oct 2017 14:20) (16 - 26)  SpO2: 99% (24 Oct 2017 14:20) (92% - 99%)  I&O's Detail    24 Oct 2017 07:01  -  24 Oct 2017 14:42  --------------------------------------------------------  IN:    Lactated Ringers IV Bolus: 2000 mL  Total IN: 2000 mL    OUT:    Accordian: 500 mL    Voided: 140 mL  Total OUT: 640 mL    Total NET: 1360 mL          PHYSICAL EXAMINATION:    GENERAL: The patient is a well-developed, well-nourished _____in no apparent distress.     HEENT: Head is normocephalic and atraumatic. Extraocular muscles are intact. Mucous membranes are moist.     NECK: Supple.     LUNGS: Clear to auscultation without wheezing, rales, or rhonchi. Respirations unlabored    HEART: Regular rate and rhythm without murmur.    ABDOMEN: Soft, nontender, and nondistended.  No hepatosplenomegaly is noted.    EXTREMITIES: Without any cyanosis, clubbing, rash, lesions or edema.    NEUROLOGIC: Grossly intact.      LABS:                        12.2   9.0   )-----------( 211      ( 24 Oct 2017 13:22 )             36.9     10-24    139  |  105  |  25<H>  ----------------------------<  163<H>  3.7   |  21<L>  |  1.44<H>    Ca    9.1      24 Oct 2017 13:22  Mg     1.3     10-24                          MICROBIOLOGY:    RADIOLOGY & ADDITIONAL STUDIES:    CXR:    Ct scan chest:    ekg;    echo: PULMONARY CONSULT NOTE      ERICA GONZALEZ  [id number]    Patient is a 72y old  Female who presents with a chief complaint of "knee pain" (24 Oct 2017 08:11)  she underwent left TKA because of severe OA. Being evaluated by pulmonary because of hx of CHRISTOPHE. Awake, alert, comfortable in bed in NAD    HISTORY OF PRESENT ILLNESS:    MEDICATIONS  (STANDING):  BUpivacaine liposome 1.3% Injectable 20 milliLiter(s) Local Injection once  dextrose 5%. 1000 milliLiter(s) (50 mL/Hr) IV Continuous <Continuous>  dextrose 50% Injectable 25 Gram(s) IV Push once  dextrose 50% Injectable 25 Gram(s) IV Push once  HYDROmorphone PCA (1 mG/mL) 30 milliLiter(s) PCA Continuous PCA Continuous  insulin lispro (HumaLOG) corrective regimen sliding scale   SubCutaneous three times a day before meals  potassium chloride    Tablet ER 20 milliEquivalent(s) Oral once      MEDICATIONS  (PRN):  glucagon  Injectable 1 milliGRAM(s) IntraMuscular once PRN Glucose LESS THAN 70 milligrams/deciliter  naloxone Injectable 0.1 milliGRAM(s) IV Push every 3 minutes PRN For ANY of the following changes in patient status:  A. RR LESS THAN 10 breaths per minute, B. Oxygen saturation LESS THAN 90%, C. Sedation score of 6  ondansetron Injectable 4 milliGRAM(s) IV Push every 6 hours PRN Nausea      Allergies    No Known Allergies    Intolerances        PAST MEDICAL & SURGICAL HISTORY:  CHRISTOPHE (obstructive sleep apnea): by criteria  Morbid obesity with BMI of 40.0-44.9, adult  OA (osteoarthritis)  CVA (cerebral vascular accident): facial weakness  2011  Hypothyroid  Arthritis  Insomnia  Depression  Hyperlipidemia  Hypertension: x 25 years, controlled  Diabetes: x 4 years, controlled  H/O total knee replacement, right: 2014 August 12  S/P bilateral cataract extraction  S/P vaginopexy      FAMILY HISTORY:  Family history of heart disease (Mother, Father)  Family history of essential hypertension (Mother)      SOCIAL HISTORY  Smoking History:     REVIEW OF SYSTEMS:    CONSTITUTIONAL:  No fevers, chills, sweats    HEENT:  Eyes:  No diplopia or blurred vision. ENT:  No earache, sore throat or runny nose.    CARDIOVASCULAR:  No pressure, squeezing, tightness, or heaviness about the chest; no palpitations.    RESPIRATORY:  Per HPI    GASTROINTESTINAL:  No abdominal pain, nausea, vomiting or diarrhea.    GENITOURINARY:  No dysuria, frequency or urgency.    NEUROLOGIC:  No paresthesias, fasciculations, seizures or weakness.    PSYCHIATRIC:  No disorder of thought or mood.    Vital Signs Last 24 Hrs  T(C): 36.9 (24 Oct 2017 14:20), Max: 36.9 (24 Oct 2017 14:20)  T(F): 98.5 (24 Oct 2017 14:20), Max: 98.5 (24 Oct 2017 14:20)  HR: 90 (24 Oct 2017 14:20) (65 - 90)  BP: 126/67 (24 Oct 2017 14:20) (98/78 - 141/76)  BP(mean): 85 (24 Oct 2017 14:20) (74 - 101)  RR: 17 (24 Oct 2017 14:20) (16 - 26)  SpO2: 99% (24 Oct 2017 14:20) (92% - 99%)  I&O's Detail    24 Oct 2017 07:01  -  24 Oct 2017 14:42  --------------------------------------------------------  IN:    Lactated Ringers IV Bolus: 2000 mL  Total IN: 2000 mL    OUT:    Accordian: 500 mL    Voided: 140 mL  Total OUT: 640 mL    Total NET: 1360 mL          PHYSICAL EXAMINATION:    GENERAL: The patient is a well-developed, well-nourished _____in no apparent distress.     HEENT: Head is normocephalic and atraumatic. Extraocular muscles are intact. Mucous membranes are moist.     NECK: Supple.     LUNGS: Clear to auscultation without wheezing, rales, or rhonchi. Respirations unlabored    HEART: Regular rate and rhythm without murmur.    ABDOMEN: Soft, nontender, and nondistended.  No hepatosplenomegaly is noted.    EXTREMITIES: Without any cyanosis, clubbing, rash, lesions or edema.    NEUROLOGIC: Grossly intact.      LABS:                        12.2   9.0   )-----------( 211      ( 24 Oct 2017 13:22 )             36.9     10-24    139  |  105  |  25<H>  ----------------------------<  163<H>  3.7   |  21<L>  |  1.44<H>    Ca    9.1      24 Oct 2017 13:22  Mg     1.3     10-24                          MICROBIOLOGY:    RADIOLOGY & ADDITIONAL STUDIES:    CXR:    Ct scan chest:    ekg;    echo: PULMONARY CONSULT NOTE      ERICA GONZALEZ  [id number]    Patient is a 72y old  Female who presents with a chief complaint of "knee pain" (24 Oct 2017 08:11)  she underwent left TKA because of severe OA. Being evaluated by pulmonary because of suspected hx of CHRISTOPHE. Awake, alert, comfortable in bed in NAD. Had loud snoring and hypersomnia but never has been tested for CHRISTOPHE.    HISTORY OF PRESENT ILLNESS:as above    MEDICATIONS  (STANDING):  BUpivacaine liposome 1.3% Injectable 20 milliLiter(s) Local Injection once  dextrose 5%. 1000 milliLiter(s) (50 mL/Hr) IV Continuous <Continuous>  dextrose 50% Injectable 25 Gram(s) IV Push once  dextrose 50% Injectable 25 Gram(s) IV Push once  HYDROmorphone PCA (1 mG/mL) 30 milliLiter(s) PCA Continuous PCA Continuous  insulin lispro (HumaLOG) corrective regimen sliding scale   SubCutaneous three times a day before meals  potassium chloride    Tablet ER 20 milliEquivalent(s) Oral once      MEDICATIONS  (PRN):  glucagon  Injectable 1 milliGRAM(s) IntraMuscular once PRN Glucose LESS THAN 70 milligrams/deciliter  naloxone Injectable 0.1 milliGRAM(s) IV Push every 3 minutes PRN For ANY of the following changes in patient status:  A. RR LESS THAN 10 breaths per minute, B. Oxygen saturation LESS THAN 90%, C. Sedation score of 6  ondansetron Injectable 4 milliGRAM(s) IV Push every 6 hours PRN Nausea      Allergies    No Known Allergies    Intolerances        PAST MEDICAL & SURGICAL HISTORY:  CHRISTOPHE (obstructive sleep apnea): by criteria  Morbid obesity with BMI of 40.0-44.9, adult  OA (osteoarthritis)  CVA (cerebral vascular accident): facial weakness  2011  Hypothyroid  Arthritis  Insomnia  Depression  Hyperlipidemia  Hypertension: x 25 years, controlled  Diabetes: x 4 years, controlled  H/O total knee replacement, right: 2014 August 12  S/P bilateral cataract extraction  S/P vaginopexy      FAMILY HISTORY:  Family history of heart disease (Mother, Father)  Family history of essential hypertension (Mother)      SOCIAL HISTORY  Smoking History:     REVIEW OF SYSTEMS:    CONSTITUTIONAL:  No fevers, chills, sweats    HEENT:  Eyes:  No diplopia or blurred vision. ENT:  No earache, sore throat or runny nose.    CARDIOVASCULAR:  No pressure, squeezing, tightness, or heaviness about the chest; no palpitations.    RESPIRATORY:  Per HPI    GASTROINTESTINAL:  No abdominal pain, nausea, vomiting or diarrhea.    GENITOURINARY:  No dysuria, frequency or urgency.    NEUROLOGIC:  No paresthesias, fasciculations, seizures or weakness.    PSYCHIATRIC:  No disorder of thought or mood.    Vital Signs Last 24 Hrs  T(C): 36.9 (24 Oct 2017 14:20), Max: 36.9 (24 Oct 2017 14:20)  T(F): 98.5 (24 Oct 2017 14:20), Max: 98.5 (24 Oct 2017 14:20)  HR: 90 (24 Oct 2017 14:20) (65 - 90)  BP: 126/67 (24 Oct 2017 14:20) (98/78 - 141/76)  BP(mean): 85 (24 Oct 2017 14:20) (74 - 101)  RR: 17 (24 Oct 2017 14:20) (16 - 26)  SpO2: 99% (24 Oct 2017 14:20) (92% - 99%)  I&O's Detail    24 Oct 2017 07:01  -  24 Oct 2017 14:42  --------------------------------------------------------  IN:    Lactated Ringers IV Bolus: 2000 mL  Total IN: 2000 mL    OUT:    Accordian: 500 mL    Voided: 140 mL  Total OUT: 640 mL    Total NET: 1360 mL          PHYSICAL EXAMINATION:    GENERAL: The patient is a well-developed, well-nourished _____in no apparent distress.     HEENT: Head is normocephalic and atraumatic. Extraocular muscles are intact. Mucous membranes are moist.     NECK: Supple.     LUNGS: Clear to auscultation without wheezing, rales, or rhonchi. Respirations unlabored    HEART: Regular rate and rhythm without murmur.    ABDOMEN: Soft, nontender, and nondistended.  No hepatosplenomegaly is noted.    EXTREMITIES: Left knee dressing clean  NEUROLOGIC: Grossly intact.      LABS:                        12.2   9.0   )-----------( 211      ( 24 Oct 2017 13:22 )             36.9     10-24    139  |  105  |  25<H>  ----------------------------<  163<H>  3.7   |  21<L>  |  1.44<H>    Ca    9.1      24 Oct 2017 13:22  Mg     1.3     10-24                          MICROBIOLOGY:    RADIOLOGY & ADDITIONAL STUDIES:    CXR:    Ct scan chest:    ekg;    echo:

## 2017-10-24 NOTE — CONSULT NOTE ADULT - PROBLEM SELECTOR RECOMMENDATION 9
- Pt - Pt very anxious and shaking 2/2 to being anxious. Was agitated and removing her clothes  - States she needs something to calm her down and sleep  - Since patient is on pain meds and narcotics, will recommended trial of sonata. If does not improve, pt will need BDZs

## 2017-10-24 NOTE — PHYSICAL THERAPY INITIAL EVALUATION ADULT - CRITERIA FOR SKILLED THERAPEUTIC INTERVENTIONS
predicted duration of therapy intervention/anticipated discharge recommendation/functional limitations in following categories/rehab potential/risk reduction/prevention/therapy frequency/anticipated equipment needs at discharge/impairments found

## 2017-10-24 NOTE — PHYSICAL THERAPY INITIAL EVALUATION ADULT - PASSIVE RANGE OF MOTION EXAMINATION, REHAB EVAL
bilateral upper extremity Passive ROM was WFL (within functional limits)/bilateral lower extremity Passive ROM was WFL (within functional limits)/Lt. knee 0-85 c/d/i

## 2017-10-25 DIAGNOSIS — M25.562 PAIN IN LEFT KNEE: ICD-10-CM

## 2017-10-25 DIAGNOSIS — T84.84XA PAIN DUE TO INTERNAL ORTHOPEDIC PROSTHETIC DEVICES, IMPLANTS AND GRAFTS, INITIAL ENCOUNTER: ICD-10-CM

## 2017-10-25 DIAGNOSIS — G47.33 OBSTRUCTIVE SLEEP APNEA (ADULT) (PEDIATRIC): ICD-10-CM

## 2017-10-25 DIAGNOSIS — G89.18 OTHER ACUTE POSTPROCEDURAL PAIN: ICD-10-CM

## 2017-10-25 LAB
ANION GAP SERPL CALC-SCNC: 9 MMOL/L — SIGNIFICANT CHANGE UP (ref 5–17)
BUN SERPL-MCNC: 20 MG/DL — HIGH (ref 7–18)
CALCIUM SERPL-MCNC: 8.8 MG/DL — SIGNIFICANT CHANGE UP (ref 8.4–10.5)
CHLORIDE SERPL-SCNC: 105 MMOL/L — SIGNIFICANT CHANGE UP (ref 96–108)
CO2 SERPL-SCNC: 23 MMOL/L — SIGNIFICANT CHANGE UP (ref 22–31)
CREAT SERPL-MCNC: 1.36 MG/DL — HIGH (ref 0.5–1.3)
GLUCOSE BLDC GLUCOMTR-MCNC: 147 MG/DL — HIGH (ref 70–99)
GLUCOSE BLDC GLUCOMTR-MCNC: 150 MG/DL — HIGH (ref 70–99)
GLUCOSE BLDC GLUCOMTR-MCNC: 158 MG/DL — HIGH (ref 70–99)
GLUCOSE BLDC GLUCOMTR-MCNC: 181 MG/DL — HIGH (ref 70–99)
GLUCOSE SERPL-MCNC: 139 MG/DL — HIGH (ref 70–99)
HCT VFR BLD CALC: 32.1 % — LOW (ref 34.5–45)
HGB BLD-MCNC: 10.6 G/DL — LOW (ref 11.5–15.5)
MCHC RBC-ENTMCNC: 31.9 PG — SIGNIFICANT CHANGE UP (ref 27–34)
MCHC RBC-ENTMCNC: 33 GM/DL — SIGNIFICANT CHANGE UP (ref 32–36)
MCV RBC AUTO: 96.7 FL — SIGNIFICANT CHANGE UP (ref 80–100)
PLATELET # BLD AUTO: 190 K/UL — SIGNIFICANT CHANGE UP (ref 150–400)
POTASSIUM SERPL-MCNC: 3.9 MMOL/L — SIGNIFICANT CHANGE UP (ref 3.5–5.3)
POTASSIUM SERPL-SCNC: 3.9 MMOL/L — SIGNIFICANT CHANGE UP (ref 3.5–5.3)
RBC # BLD: 3.32 M/UL — LOW (ref 3.8–5.2)
RBC # FLD: 11.7 % — SIGNIFICANT CHANGE UP (ref 10.3–14.5)
SODIUM SERPL-SCNC: 137 MMOL/L — SIGNIFICANT CHANGE UP (ref 135–145)
WBC # BLD: 10.4 K/UL — SIGNIFICANT CHANGE UP (ref 3.8–10.5)
WBC # FLD AUTO: 10.4 K/UL — SIGNIFICANT CHANGE UP (ref 3.8–10.5)

## 2017-10-25 PROCEDURE — 99233 SBSQ HOSP IP/OBS HIGH 50: CPT

## 2017-10-25 PROCEDURE — 93971 EXTREMITY STUDY: CPT | Mod: 26,LT

## 2017-10-25 RX ORDER — ACETAMINOPHEN 500 MG
1000 TABLET ORAL ONCE
Qty: 0 | Refills: 0 | Status: COMPLETED | OUTPATIENT
Start: 2017-10-25 | End: 2017-10-25

## 2017-10-25 RX ORDER — ALPRAZOLAM 0.25 MG
0.5 TABLET ORAL EVERY 12 HOURS
Qty: 0 | Refills: 0 | Status: DISCONTINUED | OUTPATIENT
Start: 2017-10-25 | End: 2017-10-28

## 2017-10-25 RX ORDER — GABAPENTIN 400 MG/1
100 CAPSULE ORAL EVERY 12 HOURS
Qty: 0 | Refills: 0 | Status: DISCONTINUED | OUTPATIENT
Start: 2017-10-25 | End: 2017-10-28

## 2017-10-25 RX ORDER — TRAMADOL HYDROCHLORIDE 50 MG/1
50 TABLET ORAL EVERY 12 HOURS
Qty: 0 | Refills: 0 | Status: DISCONTINUED | OUTPATIENT
Start: 2017-10-25 | End: 2017-10-28

## 2017-10-25 RX ORDER — OXYCODONE HYDROCHLORIDE 5 MG/1
5 TABLET ORAL EVERY 6 HOURS
Qty: 0 | Refills: 0 | Status: DISCONTINUED | OUTPATIENT
Start: 2017-10-25 | End: 2017-10-28

## 2017-10-25 RX ADMIN — Medication 1 MILLIGRAM(S): at 12:33

## 2017-10-25 RX ADMIN — TRAMADOL HYDROCHLORIDE 50 MILLIGRAM(S): 50 TABLET ORAL at 18:08

## 2017-10-25 RX ADMIN — LOSARTAN POTASSIUM 100 MILLIGRAM(S): 100 TABLET, FILM COATED ORAL at 05:53

## 2017-10-25 RX ADMIN — OXYCODONE HYDROCHLORIDE 5 MILLIGRAM(S): 5 TABLET ORAL at 22:26

## 2017-10-25 RX ADMIN — Medication 0.5 MILLIGRAM(S): at 20:16

## 2017-10-25 RX ADMIN — Medication 10 MILLIGRAM(S): at 12:34

## 2017-10-25 RX ADMIN — POLYETHYLENE GLYCOL 3350 17 GRAM(S): 17 POWDER, FOR SOLUTION ORAL at 12:34

## 2017-10-25 RX ADMIN — Medication 1000 MILLIGRAM(S): at 17:50

## 2017-10-25 RX ADMIN — ENOXAPARIN SODIUM 30 MILLIGRAM(S): 100 INJECTION SUBCUTANEOUS at 05:54

## 2017-10-25 RX ADMIN — SODIUM CHLORIDE 50 MILLILITER(S): 9 INJECTION, SOLUTION INTRAVENOUS at 13:54

## 2017-10-25 RX ADMIN — AMLODIPINE BESYLATE 10 MILLIGRAM(S): 2.5 TABLET ORAL at 05:53

## 2017-10-25 RX ADMIN — Medication 100 MILLIGRAM(S): at 00:46

## 2017-10-25 RX ADMIN — Medication: at 11:23

## 2017-10-25 RX ADMIN — ATORVASTATIN CALCIUM 20 MILLIGRAM(S): 80 TABLET, FILM COATED ORAL at 22:26

## 2017-10-25 RX ADMIN — Medication 100 MILLIGRAM(S): at 05:53

## 2017-10-25 RX ADMIN — Medication 500 MILLIGRAM(S): at 17:37

## 2017-10-25 RX ADMIN — OXYCODONE HYDROCHLORIDE 5 MILLIGRAM(S): 5 TABLET ORAL at 23:04

## 2017-10-25 RX ADMIN — Medication 400 MILLIGRAM(S): at 17:35

## 2017-10-25 RX ADMIN — Medication 81 MILLIGRAM(S): at 12:33

## 2017-10-25 RX ADMIN — Medication 25 MILLIGRAM(S): at 05:53

## 2017-10-25 RX ADMIN — Medication 100 MILLIGRAM(S): at 22:26

## 2017-10-25 RX ADMIN — ENOXAPARIN SODIUM 30 MILLIGRAM(S): 100 INJECTION SUBCUTANEOUS at 17:37

## 2017-10-25 RX ADMIN — Medication 1000 MILLIGRAM(S): at 11:05

## 2017-10-25 RX ADMIN — Medication 0.5 MILLIGRAM(S): at 02:29

## 2017-10-25 RX ADMIN — HYDROMORPHONE HYDROCHLORIDE 30 MILLILITER(S): 2 INJECTION INTRAMUSCULAR; INTRAVENOUS; SUBCUTANEOUS at 07:48

## 2017-10-25 RX ADMIN — Medication 100 MILLIGRAM(S): at 13:54

## 2017-10-25 RX ADMIN — TRAMADOL HYDROCHLORIDE 50 MILLIGRAM(S): 50 TABLET ORAL at 17:38

## 2017-10-25 RX ADMIN — Medication 25 MILLIGRAM(S): at 17:36

## 2017-10-25 RX ADMIN — Medication 500 MILLIGRAM(S): at 05:53

## 2017-10-25 RX ADMIN — Medication 400 MILLIGRAM(S): at 10:50

## 2017-10-25 RX ADMIN — Medication: at 09:49

## 2017-10-25 RX ADMIN — GABAPENTIN 100 MILLIGRAM(S): 400 CAPSULE ORAL at 17:36

## 2017-10-25 NOTE — CONSULT NOTE ADULT - SUBJECTIVE AND OBJECTIVE BOX
CHIEF COMPLAINT:Patient is a 72y old  Female who presents with a chief complaint of "knee pain" (24 Oct 2017 08:11)      HPI:  This is a 71 y/o female with hx as below c/o pain left knee associated with difficulty walking, Xray revealed osteoarthritis, she presents  for surgery (11 Oct 2017 13:34).Called to see PT due to frequent PVC'S in PACU. Pt had stress echo-prior to OR clearance.      PAST MEDICAL & SURGICAL HISTORY:  CHRISTOPHE (obstructive sleep apnea): by criteria  Morbid obesity with BMI of 40.0-44.9, adult  OA (osteoarthritis)  CVA (cerebral vascular accident): facial weakness  2011  Hypothyroid  Arthritis  Insomnia  Depression  Hyperlipidemia  Hypertension: x 25 years, controlled  Diabetes: x 4 years, controlled  H/O total knee replacement, right: 2014 August 12  S/P bilateral cataract extraction  S/P vaginopexy      MEDICATIONS  (STANDING):  acetaminophen  IVPB. 1000 milliGRAM(s) IV Intermittent once  amLODIPine   Tablet 10 milliGRAM(s) Oral daily  ascorbic acid 500 milliGRAM(s) Oral two times a day  aspirin  chewable 81 milliGRAM(s) Oral daily  atorvastatin 20 milliGRAM(s) Oral at bedtime  dextrose 5%. 1000 milliLiter(s) (50 mL/Hr) IV Continuous <Continuous>  dextrose 50% Injectable 25 Gram(s) IV Push once  dextrose 50% Injectable 25 Gram(s) IV Push once  docusate sodium 100 milliGRAM(s) Oral three times a day  enoxaparin Injectable 30 milliGRAM(s) SubCutaneous every 12 hours  folic acid 1 milliGRAM(s) Oral daily  gabapentin 100 milliGRAM(s) Oral every 12 hours  HYDROmorphone PCA (1 mG/mL) 30 milliLiter(s) PCA Continuous PCA Continuous  hydrOXYzine hydrochloride 25 milliGRAM(s) Oral two times a day  insulin lispro (HumaLOG) corrective regimen sliding scale   SubCutaneous three times a day before meals  losartan 100 milliGRAM(s) Oral daily  metoprolol 25 milliGRAM(s) Oral daily  PARoxetine 10 milliGRAM(s) Oral daily  polyethylene glycol 3350 17 Gram(s) Oral daily  sodium chloride 0.45%. 1000 milliLiter(s) (50 mL/Hr) IV Continuous <Continuous>    MEDICATIONS  (PRN):  acetaminophen   Tablet 650 milliGRAM(s) Oral every 6 hours PRN For Temp over 38.3 C (100.94 F)  ALPRAZolam 0.5 milliGRAM(s) Oral every 12 hours PRN Anxiety  aluminum hydroxide/magnesium hydroxide/simethicone Suspension 30 milliLiter(s) Oral four times a day PRN Indigestion  glucagon  Injectable 1 milliGRAM(s) IntraMuscular once PRN Glucose LESS THAN 70 milligrams/deciliter  magnesium hydroxide Suspension 30 milliLiter(s) Oral daily PRN Constipation  naloxone Injectable 0.1 milliGRAM(s) IV Push every 3 minutes PRN For ANY of the following changes in patient status:  A. RR LESS THAN 10 breaths per minute, B. Oxygen saturation LESS THAN 90%, C. Sedation score of 6  ondansetron Injectable 4 milliGRAM(s) IV Push every 6 hours PRN Nausea  ondansetron Injectable 4 milliGRAM(s) IV Push every 6 hours PRN Nausea and/or Vomiting      FAMILY HISTORY:  Family history of heart disease (Mother, Father)  Family history of essential hypertension (Mother)      SOCIAL HISTORY:    [X ] Non-smoker    [X ] Alcohol-social    Allergies    No Known Allergies      REVIEW OF SYSTEMS:  CONSTITUTIONAL: No fever, weight loss, or fatigue  EYES: No eye pain, visual disturbances, or discharge  ENT:  No difficulty hearing, tinnitus, vertigo; No sinus or throat pain  NECK: No pain or stiffness  RESPIRATORY: No cough, wheezing, chills or hemoptysis; No Shortness of Breath  CARDIOVASCULAR: No chest pain, palpitations, passing out, dizziness, or leg swelling  GASTROINTESTINAL: No abdominal or epigastric pain. No nausea, vomiting, or hematemesis; No diarrhea or constipation. No melena or hematochezia.  GENITOURINARY: No dysuria, frequency, hematuria, or incontinence  NEUROLOGICAL: No headaches, memory loss, loss of strength, numbness, or tremors  SKIN: No itching, burning, rashes, or lesions   LYMPH Nodes: No enlarged glands  ENDOCRINE: No heat or cold intolerance; No hair loss  MUSCULOSKELETAL: No joint pain or swelling; No muscle, back, or extremity pain  PSYCHIATRIC: No depression, anxiety, mood swings, or difficulty sleeping  HEME/LYMPH: No easy bruising, or bleeding gums  ALLERGY AND IMMUNOLOGIC: No hives or eczema	      PHYSICAL EXAM:  T(C): 37.5 (10-25-17 @ 05:43), Max: 37.6 (10-25-17 @ 00:43)  HR: 97 (10-25-17 @ 09:45) (76 - 111)  BP: 140/65 (10-25-17 @ 09:45) (99/79 - 156/86)  RR: 17 (10-25-17 @ 05:43) (16 - 23)  SpO2: 97% (10-25-17 @ 09:45) (92% - 100%)  Wt(kg): --  I&O's Summary    24 Oct 2017 07:01  -  25 Oct 2017 07:00  --------------------------------------------------------  IN: 2000 mL / OUT: 1900 mL / NET: 100 mL        Appearance: Normal	  HEENT:   Normal oral mucosa, PERRL, EOMI	  Lymphatic: No lymphadenopathy  Cardiovascular: Normal S1 S2, No JVD, No murmurs, No edema  Respiratory: Lungs clear to auscultation	  Psychiatry: A & O x 3, Mood & affect appropriate  Gastrointestinal:  Soft, Non-tender, + BS	  Skin: No rashes, No ecchymoses, No cyanosis	  Neurologic: Non-focal  Extremities: Normal range of motion, No clubbing, cyanosis or edema  Vascular: Peripheral pulses palpable 2+ bilaterally        ECG:  Sinus bradycardia, poor R wave progression	  	  LABS:	 	                        10.6   10.4  )-----------( 190      ( 25 Oct 2017 08:24 )             32.1     10-25    137  |  105  |  20<H>  ----------------------------<  139<H>  3.9   |  23  |  1.36<H>    Ca    8.8      25 Oct 2017 08:24  Phos  3.4     10-24  Mg     1.3     10-24      proBNP:   Lipid Profile:   HgA1c:   TSH:     PREVIOUS DIAGNOSTIC TESTING:    [ ] Echocardiogram:  [ ]  Catheterization:  [ ] Stress Test:

## 2017-10-25 NOTE — CONSULT NOTE ADULT - ASSESSMENT
Pt with Hx of DM, CVA, HTN, Lipid D/O, Obesity, CHRISTOPHE s/p TKR post-op PVC's.  1.Tele monitoring.  2.Echocardiogram.  3.CHRISTOPHE-CPAP, pulm eval.  4.DM-Insulin.  5.HTN-continue BP medication.  6.GI and DVT prophylaxis.
72y old  Female w/ PMHx of Arthritis, CVA w/ facial weakness in 2011, Depression  Diabetes  x 4 years, controlled, HTN x 25 years, Hypothyroid , Insomnia, Morbid obesity,  CHRISTOPHE by criteria who presents with a chief complaint of "left knee pain". Pt underwent L TKA POD # 0. Medicine was consulted for pt being very anxious, unable to calm down.

## 2017-10-26 LAB
ANION GAP SERPL CALC-SCNC: 8 MMOL/L — SIGNIFICANT CHANGE UP (ref 5–17)
BUN SERPL-MCNC: 16 MG/DL — SIGNIFICANT CHANGE UP (ref 7–18)
CALCIUM SERPL-MCNC: 8.4 MG/DL — SIGNIFICANT CHANGE UP (ref 8.4–10.5)
CHLORIDE SERPL-SCNC: 107 MMOL/L — SIGNIFICANT CHANGE UP (ref 96–108)
CO2 SERPL-SCNC: 22 MMOL/L — SIGNIFICANT CHANGE UP (ref 22–31)
CREAT SERPL-MCNC: 1.29 MG/DL — SIGNIFICANT CHANGE UP (ref 0.5–1.3)
GLUCOSE BLDC GLUCOMTR-MCNC: 118 MG/DL — HIGH (ref 70–99)
GLUCOSE BLDC GLUCOMTR-MCNC: 120 MG/DL — HIGH (ref 70–99)
GLUCOSE BLDC GLUCOMTR-MCNC: 144 MG/DL — HIGH (ref 70–99)
GLUCOSE BLDC GLUCOMTR-MCNC: 146 MG/DL — HIGH (ref 70–99)
GLUCOSE SERPL-MCNC: 120 MG/DL — HIGH (ref 70–99)
HCT VFR BLD CALC: 27.6 % — LOW (ref 34.5–45)
HGB BLD-MCNC: 9.4 G/DL — LOW (ref 11.5–15.5)
MCHC RBC-ENTMCNC: 33.6 PG — SIGNIFICANT CHANGE UP (ref 27–34)
MCHC RBC-ENTMCNC: 33.9 GM/DL — SIGNIFICANT CHANGE UP (ref 32–36)
MCV RBC AUTO: 99.1 FL — SIGNIFICANT CHANGE UP (ref 80–100)
PLATELET # BLD AUTO: 140 K/UL — LOW (ref 150–400)
POTASSIUM SERPL-MCNC: 3.4 MMOL/L — LOW (ref 3.5–5.3)
POTASSIUM SERPL-SCNC: 3.4 MMOL/L — LOW (ref 3.5–5.3)
RBC # BLD: 2.79 M/UL — LOW (ref 3.8–5.2)
RBC # FLD: 11.6 % — SIGNIFICANT CHANGE UP (ref 10.3–14.5)
SODIUM SERPL-SCNC: 137 MMOL/L — SIGNIFICANT CHANGE UP (ref 135–145)
WBC # BLD: 9 K/UL — SIGNIFICANT CHANGE UP (ref 3.8–10.5)
WBC # FLD AUTO: 9 K/UL — SIGNIFICANT CHANGE UP (ref 3.8–10.5)

## 2017-10-26 PROCEDURE — 99233 SBSQ HOSP IP/OBS HIGH 50: CPT

## 2017-10-26 RX ORDER — POTASSIUM CHLORIDE 20 MEQ
40 PACKET (EA) ORAL ONCE
Qty: 0 | Refills: 0 | Status: COMPLETED | OUTPATIENT
Start: 2017-10-26 | End: 2017-10-26

## 2017-10-26 RX ADMIN — Medication 81 MILLIGRAM(S): at 13:37

## 2017-10-26 RX ADMIN — TRAMADOL HYDROCHLORIDE 50 MILLIGRAM(S): 50 TABLET ORAL at 06:00

## 2017-10-26 RX ADMIN — OXYCODONE HYDROCHLORIDE 5 MILLIGRAM(S): 5 TABLET ORAL at 22:30

## 2017-10-26 RX ADMIN — TRAMADOL HYDROCHLORIDE 50 MILLIGRAM(S): 50 TABLET ORAL at 17:13

## 2017-10-26 RX ADMIN — ATORVASTATIN CALCIUM 20 MILLIGRAM(S): 80 TABLET, FILM COATED ORAL at 21:41

## 2017-10-26 RX ADMIN — Medication 500 MILLIGRAM(S): at 05:39

## 2017-10-26 RX ADMIN — TRAMADOL HYDROCHLORIDE 50 MILLIGRAM(S): 50 TABLET ORAL at 05:46

## 2017-10-26 RX ADMIN — Medication 10 MILLIGRAM(S): at 13:37

## 2017-10-26 RX ADMIN — Medication 100 MILLIGRAM(S): at 13:38

## 2017-10-26 RX ADMIN — Medication 25 MILLIGRAM(S): at 17:11

## 2017-10-26 RX ADMIN — Medication 25 MILLIGRAM(S): at 05:40

## 2017-10-26 RX ADMIN — ENOXAPARIN SODIUM 30 MILLIGRAM(S): 100 INJECTION SUBCUTANEOUS at 05:40

## 2017-10-26 RX ADMIN — Medication 1 MILLIGRAM(S): at 13:38

## 2017-10-26 RX ADMIN — LOSARTAN POTASSIUM 100 MILLIGRAM(S): 100 TABLET, FILM COATED ORAL at 05:40

## 2017-10-26 RX ADMIN — TRAMADOL HYDROCHLORIDE 50 MILLIGRAM(S): 50 TABLET ORAL at 17:45

## 2017-10-26 RX ADMIN — GABAPENTIN 100 MILLIGRAM(S): 400 CAPSULE ORAL at 17:11

## 2017-10-26 RX ADMIN — OXYCODONE HYDROCHLORIDE 5 MILLIGRAM(S): 5 TABLET ORAL at 21:41

## 2017-10-26 RX ADMIN — ENOXAPARIN SODIUM 30 MILLIGRAM(S): 100 INJECTION SUBCUTANEOUS at 17:11

## 2017-10-26 RX ADMIN — AMLODIPINE BESYLATE 10 MILLIGRAM(S): 2.5 TABLET ORAL at 05:40

## 2017-10-26 RX ADMIN — Medication 100 MILLIGRAM(S): at 05:40

## 2017-10-26 RX ADMIN — POLYETHYLENE GLYCOL 3350 17 GRAM(S): 17 POWDER, FOR SOLUTION ORAL at 13:37

## 2017-10-26 RX ADMIN — Medication 400 MILLIGRAM(S): at 01:08

## 2017-10-26 RX ADMIN — Medication 100 MILLIGRAM(S): at 21:41

## 2017-10-26 RX ADMIN — Medication 500 MILLIGRAM(S): at 17:11

## 2017-10-26 RX ADMIN — Medication 0.5 MILLIGRAM(S): at 21:47

## 2017-10-26 RX ADMIN — GABAPENTIN 100 MILLIGRAM(S): 400 CAPSULE ORAL at 05:40

## 2017-10-26 RX ADMIN — Medication 40 MILLIEQUIVALENT(S): at 09:34

## 2017-10-26 RX ADMIN — Medication 1000 MILLIGRAM(S): at 01:39

## 2017-10-26 NOTE — PROGRESS NOTE ADULT - ASSESSMENT
Pt with Hx of DM, CVA, HTN, Lipid D/O, Obesity, CHRISTOPHE s/p TKR post-op PVC's.  1.D/C Tele monitoring.  2.Echocardiogram.  3.CHRISTOPHE-CPAP, pulm f/u.  4.DM-Insulin.  5.HTN-continue BP medication.  6.GI and DVT prophylaxis.

## 2017-10-27 LAB
ANION GAP SERPL CALC-SCNC: 5 MMOL/L — SIGNIFICANT CHANGE UP (ref 5–17)
ANION GAP SERPL CALC-SCNC: 7 MMOL/L — SIGNIFICANT CHANGE UP (ref 5–17)
APPEARANCE UR: CLEAR — SIGNIFICANT CHANGE UP
BILIRUB UR-MCNC: NEGATIVE — SIGNIFICANT CHANGE UP
BUN SERPL-MCNC: 19 MG/DL — HIGH (ref 7–18)
BUN SERPL-MCNC: 19 MG/DL — HIGH (ref 7–18)
CALCIUM SERPL-MCNC: 9 MG/DL — SIGNIFICANT CHANGE UP (ref 8.4–10.5)
CALCIUM SERPL-MCNC: 9.2 MG/DL — SIGNIFICANT CHANGE UP (ref 8.4–10.5)
CHLORIDE SERPL-SCNC: 104 MMOL/L — SIGNIFICANT CHANGE UP (ref 96–108)
CHLORIDE SERPL-SCNC: 106 MMOL/L — SIGNIFICANT CHANGE UP (ref 96–108)
CO2 SERPL-SCNC: 24 MMOL/L — SIGNIFICANT CHANGE UP (ref 22–31)
CO2 SERPL-SCNC: 26 MMOL/L — SIGNIFICANT CHANGE UP (ref 22–31)
COLOR SPEC: YELLOW — SIGNIFICANT CHANGE UP
CREAT SERPL-MCNC: 1.25 MG/DL — SIGNIFICANT CHANGE UP (ref 0.5–1.3)
CREAT SERPL-MCNC: 1.39 MG/DL — HIGH (ref 0.5–1.3)
DIFF PNL FLD: NEGATIVE — SIGNIFICANT CHANGE UP
GLUCOSE BLDC GLUCOMTR-MCNC: 114 MG/DL — HIGH (ref 70–99)
GLUCOSE BLDC GLUCOMTR-MCNC: 151 MG/DL — HIGH (ref 70–99)
GLUCOSE BLDC GLUCOMTR-MCNC: 167 MG/DL — HIGH (ref 70–99)
GLUCOSE SERPL-MCNC: 123 MG/DL — HIGH (ref 70–99)
GLUCOSE SERPL-MCNC: 152 MG/DL — HIGH (ref 70–99)
GLUCOSE UR QL: NEGATIVE — SIGNIFICANT CHANGE UP
HCT VFR BLD CALC: 29.6 % — LOW (ref 34.5–45)
HGB BLD-MCNC: 9.7 G/DL — LOW (ref 11.5–15.5)
KETONES UR-MCNC: NEGATIVE — SIGNIFICANT CHANGE UP
LEUKOCYTE ESTERASE UR-ACNC: ABNORMAL
MCHC RBC-ENTMCNC: 32.8 GM/DL — SIGNIFICANT CHANGE UP (ref 32–36)
MCHC RBC-ENTMCNC: 33 PG — SIGNIFICANT CHANGE UP (ref 27–34)
MCV RBC AUTO: 100.7 FL — HIGH (ref 80–100)
NITRITE UR-MCNC: NEGATIVE — SIGNIFICANT CHANGE UP
PH UR: 6 — SIGNIFICANT CHANGE UP (ref 5–8)
PLATELET # BLD AUTO: 154 K/UL — SIGNIFICANT CHANGE UP (ref 150–400)
POTASSIUM SERPL-MCNC: 4.4 MMOL/L — SIGNIFICANT CHANGE UP (ref 3.5–5.3)
POTASSIUM SERPL-MCNC: 4.4 MMOL/L — SIGNIFICANT CHANGE UP (ref 3.5–5.3)
POTASSIUM SERPL-SCNC: 4.4 MMOL/L — SIGNIFICANT CHANGE UP (ref 3.5–5.3)
POTASSIUM SERPL-SCNC: 4.4 MMOL/L — SIGNIFICANT CHANGE UP (ref 3.5–5.3)
PROT UR-MCNC: 15
RBC # BLD: 2.93 M/UL — LOW (ref 3.8–5.2)
RBC # FLD: 12.1 % — SIGNIFICANT CHANGE UP (ref 10.3–14.5)
SODIUM SERPL-SCNC: 135 MMOL/L — SIGNIFICANT CHANGE UP (ref 135–145)
SODIUM SERPL-SCNC: 137 MMOL/L — SIGNIFICANT CHANGE UP (ref 135–145)
SP GR SPEC: 1.01 — SIGNIFICANT CHANGE UP (ref 1.01–1.02)
SURGICAL PATHOLOGY FINAL REPORT - CH: SIGNIFICANT CHANGE UP
UROBILINOGEN FLD QL: 1
WBC # BLD: 10.1 K/UL — SIGNIFICANT CHANGE UP (ref 3.8–10.5)
WBC # FLD AUTO: 10.1 K/UL — SIGNIFICANT CHANGE UP (ref 3.8–10.5)

## 2017-10-27 PROCEDURE — 99233 SBSQ HOSP IP/OBS HIGH 50: CPT

## 2017-10-27 PROCEDURE — 71010: CPT | Mod: 26

## 2017-10-27 RX ORDER — SODIUM CHLORIDE 9 MG/ML
1000 INJECTION INTRAMUSCULAR; INTRAVENOUS; SUBCUTANEOUS
Qty: 0 | Refills: 0 | Status: DISCONTINUED | OUTPATIENT
Start: 2017-10-27 | End: 2017-10-28

## 2017-10-27 RX ADMIN — ATORVASTATIN CALCIUM 20 MILLIGRAM(S): 80 TABLET, FILM COATED ORAL at 21:54

## 2017-10-27 RX ADMIN — ENOXAPARIN SODIUM 30 MILLIGRAM(S): 100 INJECTION SUBCUTANEOUS at 06:01

## 2017-10-27 RX ADMIN — TRAMADOL HYDROCHLORIDE 50 MILLIGRAM(S): 50 TABLET ORAL at 06:01

## 2017-10-27 RX ADMIN — GABAPENTIN 100 MILLIGRAM(S): 400 CAPSULE ORAL at 19:55

## 2017-10-27 RX ADMIN — Medication 25 MILLIGRAM(S): at 19:56

## 2017-10-27 RX ADMIN — Medication 81 MILLIGRAM(S): at 11:38

## 2017-10-27 RX ADMIN — Medication 25 MILLIGRAM(S): at 06:01

## 2017-10-27 RX ADMIN — Medication 10 MILLIGRAM(S): at 11:37

## 2017-10-27 RX ADMIN — Medication 500 MILLIGRAM(S): at 07:59

## 2017-10-27 RX ADMIN — TRAMADOL HYDROCHLORIDE 50 MILLIGRAM(S): 50 TABLET ORAL at 21:08

## 2017-10-27 RX ADMIN — GABAPENTIN 100 MILLIGRAM(S): 400 CAPSULE ORAL at 06:00

## 2017-10-27 RX ADMIN — ENOXAPARIN SODIUM 30 MILLIGRAM(S): 100 INJECTION SUBCUTANEOUS at 19:55

## 2017-10-27 RX ADMIN — Medication 650 MILLIGRAM(S): at 13:32

## 2017-10-27 RX ADMIN — Medication 500 MILLIGRAM(S): at 19:55

## 2017-10-27 RX ADMIN — POLYETHYLENE GLYCOL 3350 17 GRAM(S): 17 POWDER, FOR SOLUTION ORAL at 11:38

## 2017-10-27 RX ADMIN — Medication 100 MILLIGRAM(S): at 06:01

## 2017-10-27 RX ADMIN — Medication 100 MILLIGRAM(S): at 13:33

## 2017-10-27 RX ADMIN — Medication 1 MILLIGRAM(S): at 11:38

## 2017-10-27 RX ADMIN — Medication 100 MILLIGRAM(S): at 21:54

## 2017-10-27 RX ADMIN — TRAMADOL HYDROCHLORIDE 50 MILLIGRAM(S): 50 TABLET ORAL at 19:58

## 2017-10-27 NOTE — DISCHARGE NOTE ADULT - MEDICATION SUMMARY - MEDICATIONS TO TAKE
I will START or STAY ON the medications listed below when I get home from the hospital:    acetaminophen-oxyCODONE 325 mg-5 mg oral tablet  -- 1-2 tab(s) by mouth every 4-6 hours, As needed, for Pain MDD:6  -- Indication: For Primary osteoarthritis of left knee    losartan 100 mg oral tablet  -- 1 tab(s) by mouth once a day  -- Indication: For Primary osteoarthritis of left knee    losartan 50 mg oral tablet  -- 1 tab(s) by mouth once a day  -- Indication: For Primary osteoarthritis of left knee    Lovenox 30 mg/0.3 mL injectable solution  -- 30 milligram(s) injectable once a day   -- It is very important that you take or use this exactly as directed.  Do not skip doses or discontinue unless directed by your doctor.    -- Indication: For Primary osteoarthritis of left knee    PARoxetine 10 mg oral tablet  -- 1 tab(s) by mouth once a day  -- Indication: For Primary osteoarthritis of left knee    metFORMIN 850 mg oral tablet  -- 1 tab(s) by mouth once a day (in the morning)  -- Indication: For Primary osteoarthritis of left knee    atorvastatin 20 mg oral tablet  -- 1 tab(s) by mouth once a day (at bedtime)  -- Indication: For Primary osteoarthritis of left knee    hydrOXYzine hydrochloride 25 mg oral tablet  -- 1 tab(s) by mouth once a day (at bedtime)  -- Indication: For Primary osteoarthritis of left knee    zolpidem 10 mg oral tablet  -- 1 tab(s) by mouth once a day (at bedtime), As Needed  -- Indication: For Primary osteoarthritis of left knee    metoprolol tartrate 25 mg oral tablet  -- 1 tab(s) by mouth once a day  -- Indication: For Primary osteoarthritis of left knee    amLODIPine 10 mg oral tablet  -- 1 tab(s) by mouth once a day  -- Indication: For Primary osteoarthritis of left knee    docusate sodium 100 mg oral tablet  -- 1 tab(s) by mouth 2 times a day  -- Indication: For Primary osteoarthritis of left knee    Systane Free - ophthalmic solution  -- 1 drop(s) to each affected eye 2 times a day, As Needed  -- Indication: For Primary osteoarthritis of left knee    ascorbic acid 500 mg oral tablet  -- 1 tab(s) by mouth 2 times a day  -- Indication: For Primary osteoarthritis of left knee    folic acid 1 mg oral tablet  -- 1 tab(s) by mouth once a day  -- Indication: For Primary osteoarthritis of left knee

## 2017-10-27 NOTE — DISCHARGE NOTE ADULT - CARE PROVIDER_API CALL
Caleb Hernandez (MD), Orthopaedic Surgery; Sports Medicine  [address]  [place] [address]  Phone: [phone]  Fax: [phone]

## 2017-10-27 NOTE — PROGRESS NOTE ADULT - MS GEN HX ROS MEA POS PC
arthritis/leg pain L/joint swelling/joint pain
arthritis/joint pain/leg pain L/joint swelling
arthritis/leg pain L/joint swelling/joint pain

## 2017-10-27 NOTE — PROGRESS NOTE ADULT - NEUROLOGICAL DETAILS
responds to pain/responds to verbal commands/alert and oriented x 3
alert and oriented x 3/responds to verbal commands/responds to pain
responds to verbal commands/responds to pain/alert and oriented x 3

## 2017-10-27 NOTE — DISCHARGE NOTE ADULT - ADDITIONAL INSTRUCTIONS
Pain management  DVT PPX  WBAT with walker  D/c staples and apply stri-strips on 11/8  D/c Lovenox on 11/9  F/u with Dr Hernandez once sylvester are removed

## 2017-10-27 NOTE — DISCHARGE NOTE ADULT - CARE PLAN
Principal Discharge DX:	Unilateral primary osteoarthritis, left knee  Goal:	increase mobility and decrease pain  Instructions for follow-up, activity and diet:	Wound assessment

## 2017-10-27 NOTE — PROGRESS NOTE ADULT - ASSESSMENT
Pt with Hx of DM, CVA, HTN, Lipid D/O, Obesity, CHRISTOPHE s/p TKR post-op PVC's.  1.PT.  2.Echocardiogram.  3.CHRISTOPHE-CPAP, pulm f/u.  4.DM-Insulin.  5.HTN-continue BP medication.  6.GI and DVT prophylaxis.

## 2017-10-27 NOTE — DISCHARGE NOTE ADULT - PATIENT PORTAL LINK FT
“You can access the [facility] Patient Portal, offered by [facility], by registering with the following website: [email or web address]”

## 2017-10-28 VITALS
OXYGEN SATURATION: 95 % | DIASTOLIC BLOOD PRESSURE: 66 MMHG | RESPIRATION RATE: 16 BRPM | HEART RATE: 81 BPM | TEMPERATURE: 98 F | SYSTOLIC BLOOD PRESSURE: 134 MMHG

## 2017-10-28 LAB
GLUCOSE BLDC GLUCOMTR-MCNC: 113 MG/DL — HIGH (ref 70–99)
GLUCOSE BLDC GLUCOMTR-MCNC: 163 MG/DL — HIGH (ref 70–99)

## 2017-10-28 PROCEDURE — 71045 X-RAY EXAM CHEST 1 VIEW: CPT

## 2017-10-28 PROCEDURE — 85027 COMPLETE CBC AUTOMATED: CPT

## 2017-10-28 PROCEDURE — 73562 X-RAY EXAM OF KNEE 3: CPT

## 2017-10-28 PROCEDURE — C1776: CPT

## 2017-10-28 PROCEDURE — 97116 GAIT TRAINING THERAPY: CPT

## 2017-10-28 PROCEDURE — 83735 ASSAY OF MAGNESIUM: CPT

## 2017-10-28 PROCEDURE — 97530 THERAPEUTIC ACTIVITIES: CPT

## 2017-10-28 PROCEDURE — 86901 BLOOD TYPING SEROLOGIC RH(D): CPT

## 2017-10-28 PROCEDURE — 88311 DECALCIFY TISSUE: CPT

## 2017-10-28 PROCEDURE — 97110 THERAPEUTIC EXERCISES: CPT

## 2017-10-28 PROCEDURE — 86900 BLOOD TYPING SEROLOGIC ABO: CPT

## 2017-10-28 PROCEDURE — C1713: CPT

## 2017-10-28 PROCEDURE — 84100 ASSAY OF PHOSPHORUS: CPT

## 2017-10-28 PROCEDURE — 80048 BASIC METABOLIC PNL TOTAL CA: CPT

## 2017-10-28 PROCEDURE — 88305 TISSUE EXAM BY PATHOLOGIST: CPT

## 2017-10-28 PROCEDURE — 81001 URINALYSIS AUTO W/SCOPE: CPT

## 2017-10-28 PROCEDURE — 82962 GLUCOSE BLOOD TEST: CPT

## 2017-10-28 PROCEDURE — 93971 EXTREMITY STUDY: CPT

## 2017-10-28 PROCEDURE — 93306 TTE W/DOPPLER COMPLETE: CPT

## 2017-10-28 PROCEDURE — 86850 RBC ANTIBODY SCREEN: CPT

## 2017-10-28 RX ORDER — ASCORBIC ACID 60 MG
1 TABLET,CHEWABLE ORAL
Qty: 30 | Refills: 0
Start: 2017-10-28

## 2017-10-28 RX ORDER — ASPIRIN/CALCIUM CARB/MAGNESIUM 324 MG
1 TABLET ORAL
Qty: 0 | Refills: 0 | COMMUNITY

## 2017-10-28 RX ORDER — OXYCODONE HYDROCHLORIDE 5 MG/1
1 TABLET ORAL
Qty: 30 | Refills: 0
Start: 2017-10-28

## 2017-10-28 RX ORDER — FOLIC ACID 0.8 MG
1 TABLET ORAL
Qty: 30 | Refills: 0
Start: 2017-10-28

## 2017-10-28 RX ORDER — ENOXAPARIN SODIUM 100 MG/ML
30 INJECTION SUBCUTANEOUS
Qty: 10 | Refills: 0
Start: 2017-10-28 | End: 2017-11-07

## 2017-10-28 RX ORDER — FUROSEMIDE 40 MG
40 TABLET ORAL ONCE
Qty: 0 | Refills: 0 | Status: COMPLETED | OUTPATIENT
Start: 2017-10-28 | End: 2017-10-28

## 2017-10-28 RX ADMIN — Medication 2: at 12:27

## 2017-10-28 RX ADMIN — OXYCODONE HYDROCHLORIDE 5 MILLIGRAM(S): 5 TABLET ORAL at 12:36

## 2017-10-28 RX ADMIN — GABAPENTIN 100 MILLIGRAM(S): 400 CAPSULE ORAL at 05:57

## 2017-10-28 RX ADMIN — Medication 25 MILLIGRAM(S): at 05:57

## 2017-10-28 RX ADMIN — AMLODIPINE BESYLATE 10 MILLIGRAM(S): 2.5 TABLET ORAL at 05:56

## 2017-10-28 RX ADMIN — Medication 500 MILLIGRAM(S): at 05:56

## 2017-10-28 RX ADMIN — TRAMADOL HYDROCHLORIDE 50 MILLIGRAM(S): 50 TABLET ORAL at 06:13

## 2017-10-28 RX ADMIN — Medication 100 MILLIGRAM(S): at 14:11

## 2017-10-28 RX ADMIN — Medication 1 MILLIGRAM(S): at 12:27

## 2017-10-28 RX ADMIN — POLYETHYLENE GLYCOL 3350 17 GRAM(S): 17 POWDER, FOR SOLUTION ORAL at 12:27

## 2017-10-28 RX ADMIN — Medication 40 MILLIGRAM(S): at 14:11

## 2017-10-28 RX ADMIN — LOSARTAN POTASSIUM 100 MILLIGRAM(S): 100 TABLET, FILM COATED ORAL at 05:57

## 2017-10-28 RX ADMIN — ENOXAPARIN SODIUM 30 MILLIGRAM(S): 100 INJECTION SUBCUTANEOUS at 05:56

## 2017-10-28 RX ADMIN — Medication 81 MILLIGRAM(S): at 12:27

## 2017-10-28 RX ADMIN — Medication 100 MILLIGRAM(S): at 05:56

## 2017-10-28 RX ADMIN — Medication 10 MILLIGRAM(S): at 12:27

## 2017-10-28 RX ADMIN — TRAMADOL HYDROCHLORIDE 50 MILLIGRAM(S): 50 TABLET ORAL at 05:57

## 2017-10-28 NOTE — PROGRESS NOTE ADULT - ASSESSMENT
CHRISTOPHE  Hcvd with Diastolic CHF  Morbid obesity  TKR lt    PLAN  Lasix 40 mg daily x4 days with f/u CXR  F/u with PMD   Discussed with ortho team

## 2017-10-28 NOTE — PROGRESS NOTE ADULT - PROBLEM SELECTOR PLAN 3
Accuchecks with reg insulin coverage  check hb A1C
Check HBA1C  Accuchecks with reg insulin coverage
Check HBA1C  Accuchecks with reg insulin coverage
C/w home meds
Check HBA1C  Accuchecks with reg insulin coverage

## 2017-10-28 NOTE — PROGRESS NOTE ADULT - PROBLEM SELECTOR PLAN 5
Check lipid panel  Cont meds
Check lipid panel  Cont meds
Lipid profile  Cont meds  Low fat diet
C/w home meds
Check lipid panel  Cont meds

## 2017-10-28 NOTE — PROGRESS NOTE ADULT - PROBLEM SELECTOR PROBLEM 1
R/O CHRISTOPHE (obstructive sleep apnea)
Unilateral primary osteoarthritis, left knee
R/O CHRISTOPHE (obstructive sleep apnea)
Unilateral primary osteoarthritis, left knee
Acute pain of left knee

## 2017-10-28 NOTE — PROGRESS NOTE ADULT - PROBLEM SELECTOR PLAN 4
Control BP  Monitor BP
Monitor BP  Cont meds
Monitor BP  Cont meds
ISS and fingersticks
Monitor BP  Cont meds

## 2017-10-28 NOTE — PROGRESS NOTE ADULT - PROBLEM SELECTOR PLAN 1
Pfts and Psg as OP  trial of Bi-pap at night
Pfts and Psg as OP  trial of Bi-pap at night
S/P Lt TKA  Doing well  Dressing in place with Hemovac  Med consult for Anxiety  Postop care
Sleep study and pfts as OP  trial of C-pap at night
72yFemale S/p L Total Knee Replacement POD# 1  Daily PT-WBAT to LLE
Pfts and Psg as OP  trial of Bi-pap at night
-  iv acetaminophen prn  - oob   - no nsaids due to curry  - stool softeners  - tramadol po prn
- continue pca hydromorphone  - pca teaching done in [place]  -  iv acetaminophen prn  - oob   - no nsaids due to curry  - stool softeners
- dc pca  -  iv acetaminophen prn  - oob   - no nsaids due to curry  - stool softeners  - tramadol po prn

## 2017-10-28 NOTE — PROGRESS NOTE ADULT - PROBLEM SELECTOR PROBLEM 3
Diabetes
Hyperlipidemia
Pain due to total left knee replacement

## 2017-10-28 NOTE — PROGRESS NOTE ADULT - ASSESSMENT
Pt with Hx of DM, CVA, HTN, Lipid D/O, Obesity, CHRISTOPHE s/p TKR post-op PVC's.  1.s/p lasix yesterday.  2.CHRISTOPHE-CPAP, pulm f/u.  4.DM-Insulin.  5.HTN-continue BP medication.  6.GI and DVT prophylaxis.

## 2017-10-28 NOTE — PROGRESS NOTE ADULT - PROBLEM SELECTOR PLAN 2
Pain control  Ortho follow up  DVT PPX
Pain control  Ortho follow up  DVT PPX
S/p left TKR  pain control  DVT PPX  Pt/Rehab  Ortho follow up
CPAP at night and pulm f/u
Pain control  Ortho follow up  DVT PPX

## 2017-10-28 NOTE — PROGRESS NOTE ADULT - SUBJECTIVE AND OBJECTIVE BOX
Chief Complaint: left knee pain    HPI:   72y Female s/p left knee replacement, pod#3.  Pt complaining of left knee pain which worsens on exertion.  No nausea or vomiting.  no chest pain or sob.  Pt oob and ambulating in unit.        PAIN SCORE:     5/10    SCALE USED: (1-10 VNRS)    Allergies    No Known Allergies    Intolerances      MEDICATIONS  (STANDING):  amLODIPine   Tablet 10 milliGRAM(s) Oral daily  ascorbic acid 500 milliGRAM(s) Oral two times a day  aspirin  chewable 81 milliGRAM(s) Oral daily  atorvastatin 20 milliGRAM(s) Oral at bedtime  dextrose 5%. 1000 milliLiter(s) (50 mL/Hr) IV Continuous <Continuous>  dextrose 50% Injectable 25 Gram(s) IV Push once  dextrose 50% Injectable 25 Gram(s) IV Push once  docusate sodium 100 milliGRAM(s) Oral three times a day  enoxaparin Injectable 30 milliGRAM(s) SubCutaneous every 12 hours  folic acid 1 milliGRAM(s) Oral daily  gabapentin 100 milliGRAM(s) Oral every 12 hours  hydrOXYzine hydrochloride 25 milliGRAM(s) Oral two times a day  insulin lispro (HumaLOG) corrective regimen sliding scale   SubCutaneous three times a day before meals  losartan 100 milliGRAM(s) Oral daily  metoprolol 25 milliGRAM(s) Oral daily  PARoxetine 10 milliGRAM(s) Oral daily  polyethylene glycol 3350 17 Gram(s) Oral daily  sodium chloride 0.45%. 1000 milliLiter(s) (75 mL/Hr) IV Continuous <Continuous>  sodium chloride 0.9%. 1000 milliLiter(s) (60 mL/Hr) IV Continuous <Continuous>  traMADol 50 milliGRAM(s) Oral every 12 hours    MEDICATIONS  (PRN):  acetaminophen   Tablet 650 milliGRAM(s) Oral every 6 hours PRN For Temp over 38.3 C (100.94 F)  ALPRAZolam 0.5 milliGRAM(s) Oral every 12 hours PRN Anxiety  aluminum hydroxide/magnesium hydroxide/simethicone Suspension 30 milliLiter(s) Oral four times a day PRN Indigestion  glucagon  Injectable 1 milliGRAM(s) IntraMuscular once PRN Glucose LESS THAN 70 milligrams/deciliter  magnesium hydroxide Suspension 30 milliLiter(s) Oral daily PRN Constipation  ondansetron Injectable 4 milliGRAM(s) IV Push every 6 hours PRN Nausea and/or Vomiting  oxyCODONE    IR 5 milliGRAM(s) Oral every 6 hours PRN Severe Pain (7 - 10)      PHYSICAL EXAM:    Vital Signs Last 24 Hrs  T(C): 37.2 (27 Oct 2017 00:51), Max: 37.3 (26 Oct 2017 14:23)  T(F): 99 (27 Oct 2017 00:51), Max: 99.2 (26 Oct 2017 14:23)  HR: 95 (27 Oct 2017 10:37) (79 - 98)  BP: 140/71 (27 Oct 2017 10:37) (100/57 - 141/66)  BP(mean): --  RR: 16 (27 Oct 2017 09:03) (16 - 17)  SpO2: 97% (27 Oct 2017 10:37) (94% - 98%)             LABS:                          9.7    10.1  )-----------( 154      ( 27 Oct 2017 07:27 )             29.6     10-27    137  |  106  |  19<H>  ----------------------------<  123<H>  4.4   |  24  |  1.39<H>    Ca    9.0      27 Oct 2017 07:27            Drug Screen:        RADIOLOGY:
72yFemale    Diagnosis:  S/p L Total Knee Replacement POD# 1    Patient was seen and evaluated at bedside. Patient with no acute complaints.   Pain is  well controlled.    Pain is controlled via PCA. Renea Henderson this AM.   Denies CP/SOB, dyspnea, paresthesias, N/V/D, palpitations.     Vital Signs Last 24 Hrs  T(C): 37.5 (25 Oct 2017 05:43), Max: 37.6 (25 Oct 2017 00:43)  T(F): 99.5 (25 Oct 2017 05:43), Max: 99.7 (25 Oct 2017 00:43)  HR: 97 (25 Oct 2017 09:45) (78 - 111)  BP: 140/65 (25 Oct 2017 09:45) (114/62 - 156/86)  BP(mean): 85 (24 Oct 2017 14:20) (79 - 85)  RR: 17 (25 Oct 2017 05:43) (16 - 18)  SpO2: 97% (25 Oct 2017 09:45) (92% - 100%)  I&O's Detail    24 Oct 2017 07:01  -  25 Oct 2017 07:00  --------------------------------------------------------  IN:    Lactated Ringers IV Bolus: 2000 mL  Total IN: 2000 mL    OUT:    Accordian: 560 mL    Indwelling Catheter - Urethral: 1200 mL    Voided: 140 mL  Total OUT: 1900 mL    Total NET: 100 mL    Physical Exam:    General: AAOx3, NAD, resting comfortably in bed.    L KNEE:  Dressing is C/D/I. Skin is pink and warm. HV intact. No swelling.   Lower extremity:  (+) Calf tenderness to L calf, calves are soft. 2+pulses. NVI. (+) EHL/FHL/ADF/APF. Good capillary refill.                           10.6   10.4  )-----------( 190      ( 25 Oct 2017 08:24 )             32.1     10-25    137  |  105  |  20<H>  ----------------------------<  139<H>  3.9   |  23  |  1.36<H>    Ca    8.8      25 Oct 2017 08:24  Phos  3.4     10-24  Mg     1.3     10-24        Impression:  72yFemale S/p L Total Knee Replacement POD# 1  Plan:  -  Pain management  -  Dvt prophylaxis  -  Daily Physical Therapy:  WBAT  to LLE  -  Discharge planning: Home Vs. Rehab pending Physical therapy eval.  -  Heel bump to RLE  -  Incentive Spirometer  -  Doppler to LLE to f/o DVT  -  Continue with Post-op Antibiotics x 24hrs  -  Discontinued Meier catheter today  -  D/c HV today  -  Case d/w DR. Hernandez
72yFemale    Diagnosis:  S/p LEFT Total Knee Replacement POD#2    Patient was seen and evaluated at bedside. Patient with no acute complaints.   Pain is  well controlled.  Awaiting PT for ambulation.     Denies CP/SOB, dyspnea, paresthesias, N/V/D, palpitations.     Vital Signs Last 24 Hrs  T(C): 37 (26 Oct 2017 05:41), Max: 38.1 (26 Oct 2017 00:44)  T(F): 98.6 (26 Oct 2017 05:41), Max: 100.6 (26 Oct 2017 00:44)  HR: 72 (26 Oct 2017 05:41) (72 - 97)  BP: 116/57 (26 Oct 2017 05:41) (116/57 - 156/82)  BP(mean): --  RR: 17 (26 Oct 2017 05:41) (16 - 17)  SpO2: 97% (26 Oct 2017 05:41) (95% - 100%)  I&O's Detail      Physical Exam:    General: AAOx3, NAD, resting comfortably in bed.    Left knee:  Dressing removed-> Wound is clean, dry, with staples intact. No erythema. Skin is pink and warm. SILT.  No drainage.   Lower extremities:  No calf tenderness, calves are soft. 2+pulses. NVI. 5/5 Strength of EHL/TA/gastrocnemius B/L.  Good capillary refill. SILT.                          9.4    9.0   )-----------( 140      ( 26 Oct 2017 05:50 )             27.6     10-26    137  |  107  |  16  ----------------------------<  120<H>  3.4<L>   |  22  |  1.29    Ca    8.4      26 Oct 2017 05:50  Phos  3.4     10-24  Mg     1.3     10-24        Impression:  72yFemale S/p Left Total Knee Replacement POD#2 with hypokalemia  Plan:  -  Pain management  -  Dvt prophylaxis with Lovenox  -  Daily Physical Therapy:  WBAT of the left lower extremity with walker  -  Discharge planning: Home Vs. Rehab pending Physical therapy eval.  -  Case d/w Dr. Hernandez  -  Dressing changed today, new dressing applied.  -  Incentive spirometry encouraged.   - Hypokalemia- KCL tabs 40meq ordered for supplementation
CHIEF COMPLAINT:Patient is a 72y old  Female who presents with a chief complaint of "knee pain" .Pt appears comfortable.    	  REVIEW OF SYSTEMS:  CONSTITUTIONAL: No fever, weight loss, or fatigue  EYES: No eye pain, visual disturbances, or discharge  ENT:  No difficulty hearing, tinnitus, vertigo; No sinus or throat pain  NECK: No pain or stiffness  RESPIRATORY: No cough, wheezing, chills or hemoptysis; No Shortness of Breath  CARDIOVASCULAR: No chest pain, palpitations, passing out, dizziness, or leg swelling  GASTROINTESTINAL: No abdominal or epigastric pain. No nausea, vomiting, or hematemesis; No diarrhea or constipation. No melena or hematochezia.  GENITOURINARY: No dysuria, frequency, hematuria, or incontinence  NEUROLOGICAL: No headaches, memory loss, loss of strength, numbness, or tremors  SKIN: No itching, burning, rashes, or lesions   LYMPH Nodes: No enlarged glands  ENDOCRINE: No heat or cold intolerance; No hair loss  MUSCULOSKELETAL: No joint pain or swelling; No muscle, back, or extremity pain  PSYCHIATRIC: No depression, anxiety, mood swings, or difficulty sleeping  HEME/LYMPH: No easy bruising, or bleeding gums  ALLERGY AND IMMUNOLOGIC: No hives or eczema	      PHYSICAL EXAM:  T(C): 37.2 (10-27-17 @ 00:51), Max: 37.3 (10-26-17 @ 14:23)  HR: 95 (10-27-17 @ 10:37) (79 - 98)  BP: 140/71 (10-27-17 @ 10:37) (100/57 - 141/66)  RR: 16 (10-27-17 @ 09:03) (16 - 17)  SpO2: 97% (10-27-17 @ 10:37) (94% - 98%)    Appearance: Normal	  HEENT:   Normal oral mucosa, PERRL, EOMI	  Lymphatic: No lymphadenopathy  Cardiovascular: Normal S1 S2, No JVD, No murmurs, No edema  Respiratory: Lungs clear to auscultation	  Psychiatry: A & O x 3, Mood & affect appropriate  Gastrointestinal:  Soft, Non-tender, + BS	  Skin: No rashes, No ecchymoses, No cyanosis	  Neurologic: Non-focal  Extremities: Normal range of motion, No clubbing, cyanosis or edema  Vascular: Peripheral pulses palpable 2+ bilaterally    MEDICATIONS  (STANDING):  amLODIPine   Tablet 10 milliGRAM(s) Oral daily  ascorbic acid 500 milliGRAM(s) Oral two times a day  aspirin  chewable 81 milliGRAM(s) Oral daily  atorvastatin 20 milliGRAM(s) Oral at bedtime  dextrose 5%. 1000 milliLiter(s) (50 mL/Hr) IV Continuous <Continuous>  dextrose 50% Injectable 25 Gram(s) IV Push once  dextrose 50% Injectable 25 Gram(s) IV Push once  docusate sodium 100 milliGRAM(s) Oral three times a day  enoxaparin Injectable 30 milliGRAM(s) SubCutaneous every 12 hours  folic acid 1 milliGRAM(s) Oral daily  gabapentin 100 milliGRAM(s) Oral every 12 hours  hydrOXYzine hydrochloride 25 milliGRAM(s) Oral two times a day  insulin lispro (HumaLOG) corrective regimen sliding scale   SubCutaneous three times a day before meals  losartan 100 milliGRAM(s) Oral daily  metoprolol 25 milliGRAM(s) Oral daily  PARoxetine 10 milliGRAM(s) Oral daily  polyethylene glycol 3350 17 Gram(s) Oral daily  sodium chloride 0.45%. 1000 milliLiter(s) (75 mL/Hr) IV Continuous <Continuous>  sodium chloride 0.9%. 1000 milliLiter(s) (60 mL/Hr) IV Continuous <Continuous>  traMADol 50 milliGRAM(s) Oral every 12 hours      	  LABS:	 	                       9.7    10.1  )-----------( 154      ( 27 Oct 2017 07:27 )             29.6     10-27    137  |  106  |  19<H>  ----------------------------<  123<H>  4.4   |  24  |  1.39<H>    Ca    9.0      27 Oct 2017 07:27        HgA1c: Hemoglobin A1C, Whole Blood: 6.1 % (10-11 @ 22:36)
CHIEF COMPLAINT:Patient is a 72y old  Female who presents with a chief complaint of "knee pain". Pt appears comfortable.    	  REVIEW OF SYSTEMS:  CONSTITUTIONAL: No fever, weight loss, or fatigue  EYES: No eye pain, visual disturbances, or discharge  ENT:  No difficulty hearing, tinnitus, vertigo; No sinus or throat pain  NECK: No pain or stiffness  RESPIRATORY: No cough, wheezing, chills or hemoptysis; No Shortness of Breath  CARDIOVASCULAR: No chest pain, palpitations, passing out, dizziness, or leg swelling  GASTROINTESTINAL: No abdominal or epigastric pain. No nausea, vomiting, or hematemesis; No diarrhea or constipation. No melena or hematochezia.  GENITOURINARY: No dysuria, frequency, hematuria, or incontinence  NEUROLOGICAL: No headaches, memory loss, loss of strength, numbness, or tremors  SKIN: No itching, burning, rashes, or lesions   LYMPH Nodes: No enlarged glands  ENDOCRINE: No heat or cold intolerance; No hair loss  MUSCULOSKELETAL: No joint pain or swelling; No muscle, back, or extremity pain  PSYCHIATRIC: No depression, anxiety, mood swings, or difficulty sleeping  HEME/LYMPH: No easy bruising, or bleeding gums  ALLERGY AND IMMUNOLOGIC: No hives or eczema	      PHYSICAL EXAM:  T(C): 37 (10-26-17 @ 05:41), Max: 38.1 (10-26-17 @ 00:44)  HR: 75 (10-26-17 @ 10:58) (72 - 94)  BP: 121/66 (10-26-17 @ 10:58) (116/57 - 156/82)  RR: 17 (10-26-17 @ 05:41) (16 - 17)  SpO2: 97% (10-26-17 @ 10:58) (95% - 100%)      Appearance: Normal	  HEENT:   Normal oral mucosa, PERRL, EOMI	  Lymphatic: No lymphadenopathy  Cardiovascular: Normal S1 S2, No JVD, No murmurs, No edema  Respiratory: Lungs clear to auscultation	  Psychiatry: A & O x 3, Mood & affect appropriate  Gastrointestinal:  Soft, Non-tender, + BS	  Skin: No rashes, No ecchymoses, No cyanosis	  Neurologic: Non-focal  Extremities: Normal range of motion, No clubbing, cyanosis or edema  Vascular: Peripheral pulses palpable 2+ bilaterally    MEDICATIONS  (STANDING):  amLODIPine   Tablet 10 milliGRAM(s) Oral daily  ascorbic acid 500 milliGRAM(s) Oral two times a day  aspirin  chewable 81 milliGRAM(s) Oral daily  atorvastatin 20 milliGRAM(s) Oral at bedtime  dextrose 5%. 1000 milliLiter(s) (50 mL/Hr) IV Continuous <Continuous>  dextrose 50% Injectable 25 Gram(s) IV Push once  dextrose 50% Injectable 25 Gram(s) IV Push once  docusate sodium 100 milliGRAM(s) Oral three times a day  enoxaparin Injectable 30 milliGRAM(s) SubCutaneous every 12 hours  folic acid 1 milliGRAM(s) Oral daily  gabapentin 100 milliGRAM(s) Oral every 12 hours  hydrOXYzine hydrochloride 25 milliGRAM(s) Oral two times a day  insulin lispro (HumaLOG) corrective regimen sliding scale   SubCutaneous three times a day before meals  losartan 100 milliGRAM(s) Oral daily  metoprolol 25 milliGRAM(s) Oral daily  PARoxetine 10 milliGRAM(s) Oral daily  polyethylene glycol 3350 17 Gram(s) Oral daily  sodium chloride 0.45%. 1000 milliLiter(s) (50 mL/Hr) IV Continuous <Continuous>  traMADol 50 milliGRAM(s) Oral every 12 hours      TELEMETRY: 	nsr      	  LABS:	 	                       9.4    9.0   )-----------( 140      ( 26 Oct 2017 05:50 )             27.6     10-26    137  |  107  |  16  ----------------------------<  120<H>  3.4<L>   |  22  |  1.29    Ca    8.4      26 Oct 2017 05:50  Phos  3.4     10-24  Mg     1.3     10-24      HgA1c: Hemoglobin A1C, Whole Blood: 6.1 % (10-11 @ 22:36)
CHIEF COMPLAINT:Patient is a 72y old  Female who presents with a chief complaint of "knee pain". Pt appears comfortable.    	  REVIEW OF SYSTEMS:  CONSTITUTIONAL: No fever, weight loss, or fatigue  EYES: No eye pain, visual disturbances, or discharge  ENT:  No difficulty hearing, tinnitus, vertigo; No sinus or throat pain  NECK: No pain or stiffness  RESPIRATORY: No cough, wheezing, chills or hemoptysis; No Shortness of Breath  CARDIOVASCULAR: No chest pain, palpitations, passing out, dizziness, or leg swelling  GASTROINTESTINAL: No abdominal or epigastric pain. No nausea, vomiting, or hematemesis; No diarrhea or constipation. No melena or hematochezia.  GENITOURINARY: No dysuria, frequency, hematuria, or incontinence  NEUROLOGICAL: No headaches, memory loss, loss of strength, numbness, or tremors  SKIN: No itching, burning, rashes, or lesions   LYMPH Nodes: No enlarged glands  ENDOCRINE: No heat or cold intolerance; No hair loss  MUSCULOSKELETAL: No joint pain or swelling; No muscle, back, or extremity pain  PSYCHIATRIC: No depression, anxiety, mood swings, or difficulty sleeping  HEME/LYMPH: No easy bruising, or bleeding gums  ALLERGY AND IMMUNOLOGIC: No hives or eczema	      PHYSICAL EXAM:  T(C): 37.3 (10-28-17 @ 05:09), Max: 38.9 (10-27-17 @ 14:09)  HR: 87 (10-28-17 @ 05:09) (87 - 110)  BP: 109/56 (10-28-17 @ 05:09) (109/56 - 149/89)  RR: 18 (10-28-17 @ 05:09) (16 - 18)  SpO2: 95% (10-28-17 @ 05:09) (95% - 98%)    Appearance: Normal	  HEENT:   Normal oral mucosa, PERRL, EOMI	  Lymphatic: No lymphadenopathy  Cardiovascular: Normal S1 S2, No JVD, No murmurs, No edema  Respiratory: Lungs clear to auscultation	  Psychiatry: A & O x 3, Mood & affect appropriate  Gastrointestinal:  Soft, Non-tender, + BS	  Skin: No rashes, No ecchymoses, No cyanosis	  Neurologic: Non-focal  Extremities: Normal range of motion, No clubbing, cyanosis or edema  Vascular: Peripheral pulses palpable 2+ bilaterally    MEDICATIONS  (STANDING):  amLODIPine   Tablet 10 milliGRAM(s) Oral daily  ascorbic acid 500 milliGRAM(s) Oral two times a day  aspirin  chewable 81 milliGRAM(s) Oral daily  atorvastatin 20 milliGRAM(s) Oral at bedtime  dextrose 5%. 1000 milliLiter(s) (50 mL/Hr) IV Continuous <Continuous>  dextrose 50% Injectable 25 Gram(s) IV Push once  dextrose 50% Injectable 25 Gram(s) IV Push once  docusate sodium 100 milliGRAM(s) Oral three times a day  enoxaparin Injectable 30 milliGRAM(s) SubCutaneous every 12 hours  folic acid 1 milliGRAM(s) Oral daily  furosemide    Tablet 40 milliGRAM(s) Oral once  gabapentin 100 milliGRAM(s) Oral every 12 hours  hydrOXYzine hydrochloride 25 milliGRAM(s) Oral two times a day  insulin lispro (HumaLOG) corrective regimen sliding scale   SubCutaneous three times a day before meals  losartan 100 milliGRAM(s) Oral daily  metoprolol 25 milliGRAM(s) Oral daily  PARoxetine 10 milliGRAM(s) Oral daily  polyethylene glycol 3350 17 Gram(s) Oral daily  sodium chloride 0.45%. 1000 milliLiter(s) (75 mL/Hr) IV Continuous <Continuous>  sodium chloride 0.9%. 1000 milliLiter(s) (60 mL/Hr) IV Continuous <Continuous>  traMADol 50 milliGRAM(s) Oral every 12 hours        	  LABS:	 	                        9.7    10.1  )-----------( 154      ( 27 Oct 2017 07:27 )             29.6     10-27    135  |  104  |  19<H>  ----------------------------<  152<H>  4.4   |  26  |  1.25    Ca    9.2      27 Oct 2017 12:46      HgA1c: Hemoglobin A1C, Whole Blood: 6.1 % (10-11 @ 22:36)      CONCLUSIONS:  1. Normal mitral valve.  2. Normal trileaflet aortic valve.  3. Aortic Root: 3.3 cm.  4. Mild left atrial enlargement.  5. Normal left ventricular internal dimensions and wall  thicknesses.  6. Normal Left Ventricular Systolic Function,  (EF = 55%)  7. Grade IV diastolic dysfunction.  8. Normal right atrium.  9. Normal right ventricular size and function.  10. RV systolic pressure is 28 mm Hg.  11. Normal tricuspid valve.  12. Normal pulmonic valve.  13. Normal pericardium with no pericardial effusion.      EXAM:  CHEST-PORTABLE URGENT                            PROCEDURE DATE:  10/27/2017          INTERPRETATION:  PORTABLE CHEST X-RAY    HISTORY: post op fever. s/p L TKA.    COMPARISON: 10/11/2017.    FINDINGS:  Pulmonary vascular congestion, mild diffuse interstitial prominence and   scattered airspace opacities. Small left pleural effusion. No   pneumothorax.    The cardiac silhouette is not accurately assessed by AP technique. Aortic   calcifications.    IMPRESSION:  Pulmonary vascular congestion, mild diffuse interstitial prominence and   scattered airspace opacities. Small left pleural effusion.
Chief Complaint: left knee pain    HPI:   72y Female s/p left knee replacement, POD#1.  Pt complaining of left knee pain which worsens on exertion. Pt states that pain radiates down left leg.  No nausea or vomiting.  No chest pain or sob.  Pt oob to chair.      PAIN SCORE:    5/10     SCALE USED: (1-10 VNRS)    Allergies    No Known Allergies    Intolerances      MEDICATIONS  (STANDING):  amLODIPine   Tablet 10 milliGRAM(s) Oral daily  ascorbic acid 500 milliGRAM(s) Oral two times a day  aspirin  chewable 81 milliGRAM(s) Oral daily  atorvastatin 20 milliGRAM(s) Oral at bedtime  dextrose 5%. 1000 milliLiter(s) (50 mL/Hr) IV Continuous <Continuous>  dextrose 50% Injectable 25 Gram(s) IV Push once  dextrose 50% Injectable 25 Gram(s) IV Push once  docusate sodium 100 milliGRAM(s) Oral three times a day  enoxaparin Injectable 30 milliGRAM(s) SubCutaneous every 12 hours  folic acid 1 milliGRAM(s) Oral daily  gabapentin 100 milliGRAM(s) Oral every 12 hours  HYDROmorphone PCA (1 mG/mL) 30 milliLiter(s) PCA Continuous PCA Continuous  hydrOXYzine hydrochloride 25 milliGRAM(s) Oral two times a day  insulin lispro (HumaLOG) corrective regimen sliding scale   SubCutaneous three times a day before meals  losartan 100 milliGRAM(s) Oral daily  metoprolol 25 milliGRAM(s) Oral daily  PARoxetine 10 milliGRAM(s) Oral daily  polyethylene glycol 3350 17 Gram(s) Oral daily  sodium chloride 0.45%. 1000 milliLiter(s) (50 mL/Hr) IV Continuous <Continuous>    MEDICATIONS  (PRN):  acetaminophen   Tablet 650 milliGRAM(s) Oral every 6 hours PRN For Temp over 38.3 C (100.94 F)  ALPRAZolam 0.5 milliGRAM(s) Oral every 12 hours PRN Anxiety  aluminum hydroxide/magnesium hydroxide/simethicone Suspension 30 milliLiter(s) Oral four times a day PRN Indigestion  glucagon  Injectable 1 milliGRAM(s) IntraMuscular once PRN Glucose LESS THAN 70 milligrams/deciliter  magnesium hydroxide Suspension 30 milliLiter(s) Oral daily PRN Constipation  naloxone Injectable 0.1 milliGRAM(s) IV Push every 3 minutes PRN For ANY of the following changes in patient status:  A. RR LESS THAN 10 breaths per minute, B. Oxygen saturation LESS THAN 90%, C. Sedation score of 6  ondansetron Injectable 4 milliGRAM(s) IV Push every 6 hours PRN Nausea  ondansetron Injectable 4 milliGRAM(s) IV Push every 6 hours PRN Nausea and/or Vomiting      PHYSICAL EXAM:    Vital Signs Last 24 Hrs  T(C): 37.3 (25 Oct 2017 14:21), Max: 37.6 (25 Oct 2017 00:43)  T(F): 99.1 (25 Oct 2017 14:21), Max: 99.7 (25 Oct 2017 00:43)  HR: 75 (25 Oct 2017 14:21) (75 - 111)  BP: 137/64 (25 Oct 2017 14:21) (123/63 - 156/86)  BP(mean): --  RR: 16 (25 Oct 2017 14:21) (16 - 18)  SpO2: 95% (25 Oct 2017 14:21) (95% - 100%)             LABS:                          10.6   10.4  )-----------( 190      ( 25 Oct 2017 08:24 )             32.1     10-25    137  |  105  |  20<H>  ----------------------------<  139<H>  3.9   |  23  |  1.36<H>    Ca    8.8      25 Oct 2017 08:24  Phos  3.4     10-24  Mg     1.3     10-24            Drug Screen:        RADIOLOGY:
Chief Complaint: left knee pain    HPI:   72y Female s/p left knee replacement, POD#2.  Pt complaining of left knee pain which worsens on exertion.  No nausea or vomiting  No chest pain.  Pt  was able to ambulate with walker in hallway.        PAIN SCORE:      6/10   SCALE USED: (1-10 VNRS)    Allergies    No Known Allergies    Intolerances      MEDICATIONS  (STANDING):  amLODIPine   Tablet 10 milliGRAM(s) Oral daily  ascorbic acid 500 milliGRAM(s) Oral two times a day  aspirin  chewable 81 milliGRAM(s) Oral daily  atorvastatin 20 milliGRAM(s) Oral at bedtime  dextrose 5%. 1000 milliLiter(s) (50 mL/Hr) IV Continuous <Continuous>  dextrose 50% Injectable 25 Gram(s) IV Push once  dextrose 50% Injectable 25 Gram(s) IV Push once  docusate sodium 100 milliGRAM(s) Oral three times a day  enoxaparin Injectable 30 milliGRAM(s) SubCutaneous every 12 hours  folic acid 1 milliGRAM(s) Oral daily  gabapentin 100 milliGRAM(s) Oral every 12 hours  hydrOXYzine hydrochloride 25 milliGRAM(s) Oral two times a day  insulin lispro (HumaLOG) corrective regimen sliding scale   SubCutaneous three times a day before meals  losartan 100 milliGRAM(s) Oral daily  metoprolol 25 milliGRAM(s) Oral daily  PARoxetine 10 milliGRAM(s) Oral daily  polyethylene glycol 3350 17 Gram(s) Oral daily  sodium chloride 0.45%. 1000 milliLiter(s) (50 mL/Hr) IV Continuous <Continuous>  traMADol 50 milliGRAM(s) Oral every 12 hours    MEDICATIONS  (PRN):  acetaminophen   Tablet 650 milliGRAM(s) Oral every 6 hours PRN For Temp over 38.3 C (100.94 F)  ALPRAZolam 0.5 milliGRAM(s) Oral every 12 hours PRN Anxiety  aluminum hydroxide/magnesium hydroxide/simethicone Suspension 30 milliLiter(s) Oral four times a day PRN Indigestion  glucagon  Injectable 1 milliGRAM(s) IntraMuscular once PRN Glucose LESS THAN 70 milligrams/deciliter  magnesium hydroxide Suspension 30 milliLiter(s) Oral daily PRN Constipation  ondansetron Injectable 4 milliGRAM(s) IV Push every 6 hours PRN Nausea and/or Vomiting  oxyCODONE    IR 5 milliGRAM(s) Oral every 6 hours PRN Severe Pain (7 - 10)      PHYSICAL EXAM:    Vital Signs Last 24 Hrs  T(C): 37 (26 Oct 2017 05:41), Max: 38.1 (26 Oct 2017 00:44)  T(F): 98.6 (26 Oct 2017 05:41), Max: 100.6 (26 Oct 2017 00:44)  HR: 72 (26 Oct 2017 05:41) (72 - 94)  BP: 116/57 (26 Oct 2017 05:41) (116/57 - 156/82)  BP(mean): --  RR: 17 (26 Oct 2017 05:41) (16 - 17)  SpO2: 97% (26 Oct 2017 05:41) (95% - 100%)             LABS:                          9.4    9.0   )-----------( 140      ( 26 Oct 2017 05:50 )             27.6     10-26    137  |  107  |  16  ----------------------------<  120<H>  3.4<L>   |  22  |  1.29    Ca    8.4      26 Oct 2017 05:50  Phos  3.4     10-24  Mg     1.3     10-24            Drug Screen:        RADIOLOGY:
Ortho Note POD# 3  72yFemale    Diagnosis:  S/p Left Total Knee Replacement POD# 3    Patient is seen and evaluated at bedside; offers no acute complaints. Pain is mild; well controlled.  Has been OOB with PT; ambulation with walker    Vital Signs Last 24 Hrs  T(C): 37.2 (27 Oct 2017 00:51), Max: 37.3 (26 Oct 2017 14:23)  T(F): 99 (27 Oct 2017 00:51), Max: 99.2 (26 Oct 2017 14:23)  HR: 98 (27 Oct 2017 09:03) (75 - 98)  BP: 140/63 (27 Oct 2017 09:03) (100/57 - 141/66)  BP(mean): --  RR: 16 (27 Oct 2017 09:03) (16 - 17)  SpO2: 94% (27 Oct 2017 09:03) (94% - 98%)    Physical Exam:    General: AAOx3, in NAD, resting in bed.    Left knee:  In nl. alignment. Wound C/D/I; healing well.  Staples intact. Calves are soft, non-tender. 2+pulses. NVI.                          9.7    10.1  )-----------( 154      ( 27 Oct 2017 07:27 )             29.6     10-27    137  |  106  |  19<H>  ----------------------------<  123<H>  4.4   |  24  |  1.39<H>    Ca    9.0      27 Oct 2017 07:27        Impression:  72yFemale S/p Left total knee replacement POD# 3  Plan:  -  Continue pain management  -  DVT prophylaxis with Lovenox  -  Daily Physical Therapy:  WBAT on LLE with walker  -  Discharge planning today if cleared by cardio  -  Dressing changed  -  Encouraged use of incentive spirometer  -  D/c Lovenox and d/c staples in 11 days  -  Case d/w 
Ortho Note POD# 4  72yFemale    Diagnosis:  S/p Left Total Knee Replacement POD# 4    Patient is seen and evaluated at bedside; offers no acute complaints. Pain is mild; well controlled.  Has been OOB with PT; ambulation with walker    Vital Signs Last 24 Hrs  T(C): 37.3 (28 Oct 2017 05:09), Max: 38.9 (27 Oct 2017 14:09)  T(F): 99.2 (28 Oct 2017 05:09), Max: 102 (27 Oct 2017 14:09)  HR: 87 (28 Oct 2017 05:09) (87 - 110)  BP: 109/56 (28 Oct 2017 05:09) (109/56 - 149/89)  BP(mean): --  RR: 18 (28 Oct 2017 05:09) (16 - 18)  SpO2: 95% (28 Oct 2017 05:09) (95% - 98%)    Physical Exam:    General: AAOx3, in NAD, resting in bed.    Left knee:  In nl. alignment. Wound C/D/I; healing well.  Staples intact. Calves are soft, non-tender. 2+pulses. NVI.                   UA- Neg      CXR- Small pleural effusion with congestion         Impression:  72yFemale S/p Left total knee replacement POD# 4  Plan:  -  Continue pain management  -  DVT prophylaxis with Lovenox  -  Daily Physical Therapy:  WBAT on LLE with walker  -  Discharge planning today home  -  Dressing changed  -  Encouraged use of incentive spirometer  -  D/c Lovenox and d/c staples in 10 days  -  Case d/w Dr Paulino (pulm) - States patient cleared to be discharge today if afebrile for 24 hrs.  Lasix 40mg PO 1 dose in hospital. F/u with                   another CXR in 4 days  -  Case d/w 
Orthopedics    Dx: S/p L TKA POD#0    Nurse called regarding PVC's on EKG Strip  Pt was seen by me and evaluated in PACU. No acute complaints.  Pain controlled. 2/10 of L Knee.  Pt denies Chest pain, SOB, dyspnea, paresthesias, N/V/D, abdominal pain, syncope, or pain anywhere else.     Vital Signs Last 24 Hrs  T(C): 36.7 (24 Oct 2017 11:50), Max: 36.7 (24 Oct 2017 11:50)  T(F): 98 (24 Oct 2017 11:50), Max: 98 (24 Oct 2017 11:50)  HR: 76 (24 Oct 2017 12:50) (65 - 90)  BP: 99/79 (24 Oct 2017 12:50) (98/78 - 141/76)  BP(mean): 86 (24 Oct 2017 12:50) (74 - 101)  RR: 20 (24 Oct 2017 12:50) (18 - 26)  SpO2: 96% (24 Oct 2017 12:50) (96% - 97%)    PE:  General: NAD AAOx3.   Abd: +BS, soft, NT ND  L Knee: Dressing c/d/i. HV intact. SILT, NVI.   Lower ext: No CT Calves soft 2+pulses NVI    labs:  10-24    139  |  105  |  25<H>  ----------------------------<  163<H>  3.7   |  21<L>  |  1.44<H>    Ca    9.1      24 Oct 2017 13:22                          12.2   9.0   )-----------( 211      ( 24 Oct 2017 13:22 )             36.9   Mag= pending  Phosph= pending    Impression: 71 y/o F S/p L TKA POD#0  Plan:  - Pain control  - DVT ppx with Lovenox  - PT; WBAT of LLE  - Potassium tablet 20meq once   - Added on-> Serum Mg+, phoshorus+  - Dr Cole called and made aware.  - [facility] telemetry  - Dr Dan called for evaluation of sleep apnea. Conitnuous pulse oximetry on [facility]  - post-op abx x24hrs  - case d/w dr Hernandez
POSTOP NOTE    Patient is doing okay.        Vital Signs Last 24 Hrs  T(C): 37.2 (24 Oct 2017 21:24), Max: 37.2 (24 Oct 2017 21:24)  T(F): 98.9 (24 Oct 2017 21:24), Max: 98.9 (24 Oct 2017 21:24)  HR: 78 (24 Oct 2017 21:24) (65 - 101)  BP: 125/54 (24 Oct 2017 21:24) (98/78 - 141/76)  BP(mean): 85 (24 Oct 2017 14:20) (74 - 101)  RR: 18 (24 Oct 2017 21:24) (16 - 26)  SpO2: 100% (24 Oct 2017 21:24) (92% - 100%)    Daily Height in cm: 142.24 (24 Oct 2017 08:29)    Daily     I&O's Detail    24 Oct 2017 07:01  -  24 Oct 2017 23:43  --------------------------------------------------------  IN:    Lactated Ringers IV Bolus: 2000 mL  Total IN: 2000 mL    OUT:    Accordian: 500 mL    Voided: 140 mL  Total OUT: 640 mL    Total NET: 1360 mL          MEDICATIONS  (STANDING):  amLODIPine   Tablet 10 milliGRAM(s) Oral daily  ascorbic acid 500 milliGRAM(s) Oral two times a day  aspirin  chewable 81 milliGRAM(s) Oral daily  atorvastatin 20 milliGRAM(s) Oral at bedtime  ceFAZolin   IVPB 1000 milliGRAM(s) IV Intermittent every 8 hours  dextrose 5%. 1000 milliLiter(s) (50 mL/Hr) IV Continuous <Continuous>  dextrose 50% Injectable 25 Gram(s) IV Push once  dextrose 50% Injectable 25 Gram(s) IV Push once  docusate sodium 100 milliGRAM(s) Oral three times a day  enoxaparin Injectable 30 milliGRAM(s) SubCutaneous every 12 hours  folic acid 1 milliGRAM(s) Oral daily  HYDROmorphone PCA (1 mG/mL) 30 milliLiter(s) PCA Continuous PCA Continuous  hydrOXYzine hydrochloride 25 milliGRAM(s) Oral two times a day  insulin lispro (HumaLOG) corrective regimen sliding scale   SubCutaneous three times a day before meals  losartan 100 milliGRAM(s) Oral daily  metoprolol 25 milliGRAM(s) Oral daily  PARoxetine 10 milliGRAM(s) Oral daily  polyethylene glycol 3350 17 Gram(s) Oral daily  sodium chloride 0.45%. 1000 milliLiter(s) (50 mL/Hr) IV Continuous <Continuous>    MEDICATIONS  (PRN):  acetaminophen   Tablet 650 milliGRAM(s) Oral every 6 hours PRN For Temp over 38.3 C (100.94 F)  aluminum hydroxide/magnesium hydroxide/simethicone Suspension 30 milliLiter(s) Oral four times a day PRN Indigestion  glucagon  Injectable 1 milliGRAM(s) IntraMuscular once PRN Glucose LESS THAN 70 milligrams/deciliter  magnesium hydroxide Suspension 30 milliLiter(s) Oral daily PRN Constipation  naloxone Injectable 0.1 milliGRAM(s) IV Push every 3 minutes PRN For ANY of the following changes in patient status:  A. RR LESS THAN 10 breaths per minute, B. Oxygen saturation LESS THAN 90%, C. Sedation score of 6  ondansetron Injectable 4 milliGRAM(s) IV Push every 6 hours PRN Nausea  ondansetron Injectable 4 milliGRAM(s) IV Push every 6 hours PRN Nausea and/or Vomiting      PHYSICAL EXAM:  GENERAL: In no acute distress  CHEST/LUNG: Clear to percussion bilaterally; Normal respiratory effort  HEART: Regular rate and rhythm  ABDOMEN: Soft, Nontender, Nondistended; Bowel sounds present  EXTREMITIES: Left knee dressing intact, Hemovac serosanguinous    LABS:                        12.2   9.0   )-----------( 211      ( 24 Oct 2017 13:22 )             36.9     Neutrophil %:   10-24    139  |  105  |  25<H>  ----------------------------<  163<H>  3.7   |  21<L>  |  1.44<H>    Ca    9.1      24 Oct 2017 13:22  Phos  3.4     10-24  Mg     1.3     10-24            RADIOLOGY & ADDITIONAL STUDIES:
PULMONARY  progress note    ERICA GONZALEZ  [id number]    Patient is a 72y old  Female who presents with a chief complaint of "knee pain" (27 Oct 2017 11:31)  No chest pain oo sob, But SANTILLAN    Home Medications:  acetaminophen-oxyCODONE 325 mg-5 mg oral tablet: 1-2 tab(s) orally every 4-6 hours, As needed, for Pain (24 Oct 2017 08:22)  amLODIPine 10 mg oral tablet: 1 tab(s) orally once a day (24 Oct 2017 08:22)  aspirin 81 mg oral tablet: 1 tab(s) orally once a day (at bedtime) (24 Oct 2017 08:22)  atorvastatin 20 mg oral tablet: 1 tab(s) orally once a day (at bedtime) (24 Oct 2017 08:22)  docusate sodium 100 mg oral tablet: 1 tab(s) orally 2 times a day (24 Oct 2017 08:22)  hydrOXYzine hydrochloride 25 mg oral tablet: 1 tab(s) orally once a day (at bedtime) (24 Oct 2017 08:22)  losartan 100 mg oral tablet: 1 tab(s) orally once a day (24 Oct 2017 08:22)  losartan 50 mg oral tablet: 1 tab(s) orally once a day (24 Oct 2017 08:22)  metFORMIN 850 mg oral tablet: 1 tab(s) orally once a day (in the morning) (24 Oct 2017 08:22)  metoprolol tartrate 25 mg oral tablet: 1 tab(s) orally once a day (24 Oct 2017 08:22)  PARoxetine 10 mg oral tablet: 1 tab(s) orally once a day (24 Oct 2017 08:22)  Systane Free - ophthalmic solution: 1 drop(s) to each affected eye 2 times a day, As Needed (24 Oct 2017 08:22)  zolpidem 10 mg oral tablet: 1 tab(s) orally once a day (at bedtime), As Needed (24 Oct 2017 08:22)      MEDICATIONS  (STANDING):  amLODIPine   Tablet 10 milliGRAM(s) Oral daily  ascorbic acid 500 milliGRAM(s) Oral two times a day  aspirin  chewable 81 milliGRAM(s) Oral daily  atorvastatin 20 milliGRAM(s) Oral at bedtime  dextrose 5%. 1000 milliLiter(s) (50 mL/Hr) IV Continuous <Continuous>  dextrose 50% Injectable 25 Gram(s) IV Push once  dextrose 50% Injectable 25 Gram(s) IV Push once  docusate sodium 100 milliGRAM(s) Oral three times a day  enoxaparin Injectable 30 milliGRAM(s) SubCutaneous every 12 hours  folic acid 1 milliGRAM(s) Oral daily  furosemide    Tablet 40 milliGRAM(s) Oral once  gabapentin 100 milliGRAM(s) Oral every 12 hours  hydrOXYzine hydrochloride 25 milliGRAM(s) Oral two times a day  insulin lispro (HumaLOG) corrective regimen sliding scale   SubCutaneous three times a day before meals  losartan 100 milliGRAM(s) Oral daily  metoprolol 25 milliGRAM(s) Oral daily  PARoxetine 10 milliGRAM(s) Oral daily  polyethylene glycol 3350 17 Gram(s) Oral daily  sodium chloride 0.45%. 1000 milliLiter(s) (75 mL/Hr) IV Continuous <Continuous>  sodium chloride 0.9%. 1000 milliLiter(s) (60 mL/Hr) IV Continuous <Continuous>  traMADol 50 milliGRAM(s) Oral every 12 hours      MEDICATIONS  (PRN):  acetaminophen   Tablet 650 milliGRAM(s) Oral every 6 hours PRN For Temp over 38.3 C (100.94 F)  ALPRAZolam 0.5 milliGRAM(s) Oral every 12 hours PRN Anxiety  aluminum hydroxide/magnesium hydroxide/simethicone Suspension 30 milliLiter(s) Oral four times a day PRN Indigestion  glucagon  Injectable 1 milliGRAM(s) IntraMuscular once PRN Glucose LESS THAN 70 milligrams/deciliter  magnesium hydroxide Suspension 30 milliLiter(s) Oral daily PRN Constipation  ondansetron Injectable 4 milliGRAM(s) IV Push every 6 hours PRN Nausea and/or Vomiting  oxyCODONE    IR 5 milliGRAM(s) Oral every 6 hours PRN Severe Pain (7 - 10)      Allergies    No Known Allergies            PAST MEDICAL & SURGICAL HISTORY:  CHRISTOPHE (obstructive sleep apnea): by criteria  Morbid obesity with BMI of 40.0-44.9, adult  OA (osteoarthritis)  CVA (cerebral vascular accident): facial weakness    Hypothyroid  Arthritis  Insomnia  Depression  Hyperlipidemia  Hypertension: x 25 years, controlled  Diabetes: x 4 years, controlled  H/O total knee replacement, right: 2014  S/P bilateral cataract extraction  S/P vaginopexy           REVIEW OF SYSTEMS:  CONSTITUTIONAL: No fever, weight loss, or fatigue   EYES: No eye pain, visual disturbances, or discharge  ENT:  No difficulty hearing, tinnitus, vertigo; No sinus or throat pain  NECK: No pain or stiffness or nodes  RESPIRATORY: no  cough   wheezing   chills   hemoptysis, but  Shortness of Breath+  CARDIOVASCULAR: No chest pain, palpitations, passing out, dizziness,  Lt  leg swelling+  GASTROINTESTINAL: No abdominal or epigastric pain. No nausea, vomiting, or hematemesis; No diarrhea or constipation. No melena or hematochezia.  GENITOURINARY: No dysuria, frequency, hematuria, or incontinence  NEUROLOGICAL: No headaches, memory loss, loss of strength, numbness, or tremors  SKIN: No itching, burning, rashes, or lesions   LYMPH Nodes: No enlarged glands  ENDOCRINE: No heat or cold intolerance; No hair loss  MUSCULOSKELETAL: No joint pain or swelling; No muscle, back, or extremity pain  PSYCHIATRIC: No depression, anxiety, mood swings, or difficulty sleeping  HEME/LYMPH: No easy bruising, or bleeding gums  ALLERGY AND IMMUNOLOGIC: No hives or eczema    Vital Signs Last 24 Hrs  T(C): 37.3 (28 Oct 2017 05:09), Max: 38.9 (27 Oct 2017 14:09)  T(F): 99.2 (28 Oct 2017 05:09), Max: 102 (27 Oct 2017 14:09)  HR: 87 (28 Oct 2017 05:09) (87 - 110)  BP: 109/56 (28 Oct 2017 05:09) (109/56 - 149/89)  BP(mean): --  RR: 18 (28 Oct 2017 05:09) (16 - 18)  SpO2: 95% (28 Oct 2017 05:09) (95% - 98%)  I&O's Detail      PHYSICAL EXAMINATION:    GENERAL: The patient is a well-developed, well-nourished in no apparent distress.   SKIN no rash ecchymoses or bruises  HEENT: Head is normocephalic and atraumatic  ANGELO , Extraocular muscles are intact. Mucous membranes  moist.   Neck supple ,No LN felt JVP not increased  Thyroid not enlarged  Cardiovascular:  S1 S2 heard, RSR, No JVD , systolic  murmur at apex, No gallop or rub  Respiratory: Chest wall symmetrical with good air entry ,Percussion note normal,    Lungs vesicular breathing with fine lt basal   rales, no  wheeze	  ABDOMEN:  Soft, Non-tender, obese no hepatomegaly or splenomegaly BS positive	  Extremities: Normal range of motion, No clubbing, cyanosis. Lt leg edema 1+, lt knee dressing  Vascular: Peripheral pulses palpable 2+ bilaterally  CNS:  Alert and oriented x3   Cranial nerves intact  sensory intact  motor power5/5  dtr 2+   Babinski neg    LABS:                        9.7    10.1  )-----------( 154      ( 27 Oct 2017 07:27 )             29.6     10    135  |  104  |  19<H>  ----------------------------<  152<H>  4.4   |  26  |  1.25    Ca    9.2      27 Oct 2017 12:46        Urinalysis Basic - ( 27 Oct 2017 19:47 )    Color: Yellow / Appearance: Clear / S.010 / pH: x  Gluc: x / Ketone: Negative  / Bili: Negative / Urobili: 1   Blood: x / Protein: 15 / Nitrite: Negative   Leuk Esterase: Trace / RBC: 0-2 /HPF / WBC 0-2 /HPF   Sq Epi: x / Non Sq Epi: Few /HPF / Bacteria: Trace /HPF      CXR: Cardiomegaly with mild vascular markings increased with blunted lt CP angle    ECHO: < from: Transthoracic Echocardiogram (10.27.17 @ 09:02) >  1. Normal mitral valve.  2. Normal trileaflet aortic valve.  3. Aortic Root: 3.3 cm.  4. Mild left atrial enlargement.  5. Normal left ventricular internal dimensions and wall  thicknesses.  6. Normal Left Ventricular Systolic Function,  (EF = 55%)  7. Grade IV diastolic dysfunction.  8. Normal right atrium
PULMONARY  progress note    ERICA GONZALEZ  [id number]    Patient is a 72y old  Female who presents with a chief complaint of "knee pain" (24 Oct 2017 08:11)  Feels better not using cpap as per son bedside    Home Medications:  acetaminophen-oxyCODONE 325 mg-5 mg oral tablet: 1-2 tab(s) orally every 4-6 hours, As needed, for Pain (24 Oct 2017 08:22)  amLODIPine 10 mg oral tablet: 1 tab(s) orally once a day (24 Oct 2017 08:22)  aspirin 81 mg oral tablet: 1 tab(s) orally once a day (at bedtime) (24 Oct 2017 08:22)  atorvastatin 20 mg oral tablet: 1 tab(s) orally once a day (at bedtime) (24 Oct 2017 08:22)  docusate sodium 100 mg oral tablet: 1 tab(s) orally 2 times a day (24 Oct 2017 08:22)  hydrOXYzine hydrochloride 25 mg oral tablet: 1 tab(s) orally once a day (at bedtime) (24 Oct 2017 08:22)  losartan 100 mg oral tablet: 1 tab(s) orally once a day (24 Oct 2017 08:22)  losartan 50 mg oral tablet: 1 tab(s) orally once a day (24 Oct 2017 08:22)  metFORMIN 850 mg oral tablet: 1 tab(s) orally once a day (in the morning) (24 Oct 2017 08:22)  metoprolol tartrate 25 mg oral tablet: 1 tab(s) orally once a day (24 Oct 2017 08:22)  PARoxetine 10 mg oral tablet: 1 tab(s) orally once a day (24 Oct 2017 08:22)  Systane Free - ophthalmic solution: 1 drop(s) to each affected eye 2 times a day, As Needed (24 Oct 2017 08:22)  zolpidem 10 mg oral tablet: 1 tab(s) orally once a day (at bedtime), As Needed (24 Oct 2017 08:22)      MEDICATIONS  (STANDING):  amLODIPine   Tablet 10 milliGRAM(s) Oral daily  ascorbic acid 500 milliGRAM(s) Oral two times a day  aspirin  chewable 81 milliGRAM(s) Oral daily  atorvastatin 20 milliGRAM(s) Oral at bedtime  dextrose 5%. 1000 milliLiter(s) (50 mL/Hr) IV Continuous <Continuous>  dextrose 50% Injectable 25 Gram(s) IV Push once  dextrose 50% Injectable 25 Gram(s) IV Push once  docusate sodium 100 milliGRAM(s) Oral three times a day  enoxaparin Injectable 30 milliGRAM(s) SubCutaneous every 12 hours  folic acid 1 milliGRAM(s) Oral daily  gabapentin 100 milliGRAM(s) Oral every 12 hours  hydrOXYzine hydrochloride 25 milliGRAM(s) Oral two times a day  insulin lispro (HumaLOG) corrective regimen sliding scale   SubCutaneous three times a day before meals  losartan 100 milliGRAM(s) Oral daily  metoprolol 25 milliGRAM(s) Oral daily  PARoxetine 10 milliGRAM(s) Oral daily  polyethylene glycol 3350 17 Gram(s) Oral daily  sodium chloride 0.45%. 1000 milliLiter(s) (75 mL/Hr) IV Continuous <Continuous>  sodium chloride 0.9%. 1000 milliLiter(s) (60 mL/Hr) IV Continuous <Continuous>  traMADol 50 milliGRAM(s) Oral every 12 hours      MEDICATIONS  (PRN):  acetaminophen   Tablet 650 milliGRAM(s) Oral every 6 hours PRN For Temp over 38.3 C (100.94 F)  ALPRAZolam 0.5 milliGRAM(s) Oral every 12 hours PRN Anxiety  aluminum hydroxide/magnesium hydroxide/simethicone Suspension 30 milliLiter(s) Oral four times a day PRN Indigestion  glucagon  Injectable 1 milliGRAM(s) IntraMuscular once PRN Glucose LESS THAN 70 milligrams/deciliter  magnesium hydroxide Suspension 30 milliLiter(s) Oral daily PRN Constipation  ondansetron Injectable 4 milliGRAM(s) IV Push every 6 hours PRN Nausea and/or Vomiting  oxyCODONE    IR 5 milliGRAM(s) Oral every 6 hours PRN Severe Pain (7 - 10)      Allergies    No Known Allergies            PAST MEDICAL & SURGICAL HISTORY:  CHRISTOPHE (obstructive sleep apnea): by criteria  Morbid obesity with BMI of 40.0-44.9, adult  OA (osteoarthritis)  CVA (cerebral vascular accident): facial weakness  2011  Hypothyroid  Arthritis  Insomnia  Depression  Hyperlipidemia  Hypertension: x 25 years, controlled  Diabetes: x 4 years, controlled  H/O total knee replacement, right: 2014 August 12  S/P bilateral cataract extraction  S/P vaginopexy           REVIEW OF SYSTEMS:  CONSTITUTIONAL: No fever, weight loss, or fatigue   EYES: No eye pain, visual disturbances, or discharge  ENT:  No difficulty hearing, tinnitus, vertigo; No sinus or throat pain  NECK: No pain or stiffness or nodes  RESPIRATORY: no  cough   wheezing   chills   hemoptysis  or Shortness of Breath  CARDIOVASCULAR: No chest pain, palpitations, passing out, dizziness, or leg swelling  GASTROINTESTINAL: No abdominal or epigastric pain. No nausea, vomiting, or hematemesis; No diarrhea or constipation. No melena or hematochezia.  GENITOURINARY: No dysuria, frequency, hematuria, or incontinence  NEUROLOGICAL: No headaches, memory loss, loss of strength, numbness, or tremors  SKIN: No itching, burning, rashes, or lesions   LYMPH Nodes: No enlarged glands  ENDOCRINE: No heat or cold intolerance; No hair loss  MUSCULOSKELETAL: No joint pain or swelling; No muscle, back, pain , Pain lt knee  PSYCHIATRIC: No depression, anxiety, mood swings, or difficulty sleeping  HEME/LYMPH: No easy bruising, or bleeding gums  ALLERGY AND IMMUNOLOGIC: No hives or eczema    Vital Signs Last 24 Hrs  T(C): 37.2 (27 Oct 2017 00:51), Max: 37.3 (26 Oct 2017 14:23)  T(F): 99 (27 Oct 2017 00:51), Max: 99.2 (26 Oct 2017 14:23)  HR: 95 (27 Oct 2017 10:37) (79 - 98)  BP: 140/71 (27 Oct 2017 10:37) (100/57 - 141/66)  BP(mean): --  RR: 16 (27 Oct 2017 09:03) (16 - 17)  SpO2: 97% (27 Oct 2017 10:37) (94% - 98%)  I&O's Detail      PHYSICAL EXAMINATION:    GENERAL: The patient is a well-developed, well-nourished in no apparent distress  obese W/f  SKIN no rash ecchymoses or bruises  HEENT: Head is normocephalic and atraumatic  ANGELO , Extraocular muscles are intact. Mucous membranes  moist.   Neck supple ,No LN felt JVP not increased  Thyroid not enlarged  Cardiovascular:  S1 S2 heard, RSR, No JVD , systolic  murmur at apex, No gallop or rub  Respiratory: Chest wall symmetrical with good air entry ,Percussion note normal,    Lungs vesicular breathing with no   rales  or  wheeze	  ABDOMEN:  Soft, Non-tender,  no hepatomegaly or splenomegaly BS positive	  Extremities: Normal range of motion, No clubbing, cyanosis or edema, dressing lt knee  Vascular: Peripheral pulses palpable 2+ bilaterally  CNS:  Alert and oriented x3   Cranial nerves intact  sensory intact  motor power5/5  dtr 2+   Babinski neg    LABS:                        9.7    10.1  )-----------( 154      ( 27 Oct 2017 07:27 )             29.6     10-27    137  |  106  |  19<H>  ----------------------------<  123<H>  4.4   |  24  |  1.39<H>    Ca    9.0      27 Oct 2017 07:27
Patient is a 72y old  Female who presents with a chief complaint of "knee pain" (24 Oct 2017 08:11)  S/p left TKR. Awake, alert, comfortable OOB to chair in NAD. Being followed by pulmonary because of suspected CHRISTOPHE.    INTERVAL HPI/OVERNIGHT EVENTS:      VITAL SIGNS:  T(F): 99.5 (10-25-17 @ 05:43)  HR: 97 (10-25-17 @ 09:45)  BP: 140/65 (10-25-17 @ 09:45)  RR: 17 (10-25-17 @ 05:43)  SpO2: 97% (10-25-17 @ 09:45)  Wt(kg): --  I&O's Detail    24 Oct 2017 07:01  -  25 Oct 2017 07:00  --------------------------------------------------------  IN:    Lactated Ringers IV Bolus: 2000 mL  Total IN: 2000 mL    OUT:    Accordian: 560 mL    Indwelling Catheter - Urethral: 1200 mL    Voided: 140 mL  Total OUT: 1900 mL    Total NET: 100 mL              REVIEW OF SYSTEMS:    CONSTITUTIONAL:  No fevers, chills, sweats    HEENT:  Eyes:  No diplopia or blurred vision. ENT:  No earache, sore throat or runny nose.    CARDIOVASCULAR:  No pressure, squeezing, tightness, or heaviness about the chest; no palpitations.    RESPIRATORY:  Per HPI    GASTROINTESTINAL:  No abdominal pain, nausea, vomiting or diarrhea.    GENITOURINARY:  No dysuria, frequency or urgency.    NEUROLOGIC:  No paresthesias, fasciculations, seizures or weakness.    PSYCHIATRIC:  No disorder of thought or mood.      PHYSICAL EXAM:    Constitutional: Well developed and nourished  Eyes:Perrla  ENMT: normal  Neck:supple  Respiratory: good air entry  Cardiovascular: S1 S2 regular  Gastrointestinal: Soft, Non tender  Extremities: left knee dressing clean  Vascular:normal  Neurological:Awake, alert,Ox3  Musculoskeletal:Normal      MEDICATIONS  (STANDING):  acetaminophen  IVPB. 1000 milliGRAM(s) IV Intermittent once  acetaminophen  IVPB. 1000 milliGRAM(s) IV Intermittent once  amLODIPine   Tablet 10 milliGRAM(s) Oral daily  ascorbic acid 500 milliGRAM(s) Oral two times a day  aspirin  chewable 81 milliGRAM(s) Oral daily  atorvastatin 20 milliGRAM(s) Oral at bedtime  dextrose 5%. 1000 milliLiter(s) (50 mL/Hr) IV Continuous <Continuous>  dextrose 50% Injectable 25 Gram(s) IV Push once  dextrose 50% Injectable 25 Gram(s) IV Push once  docusate sodium 100 milliGRAM(s) Oral three times a day  enoxaparin Injectable 30 milliGRAM(s) SubCutaneous every 12 hours  folic acid 1 milliGRAM(s) Oral daily  gabapentin 100 milliGRAM(s) Oral every 12 hours  HYDROmorphone PCA (1 mG/mL) 30 milliLiter(s) PCA Continuous PCA Continuous  hydrOXYzine hydrochloride 25 milliGRAM(s) Oral two times a day  insulin lispro (HumaLOG) corrective regimen sliding scale   SubCutaneous three times a day before meals  losartan 100 milliGRAM(s) Oral daily  metoprolol 25 milliGRAM(s) Oral daily  PARoxetine 10 milliGRAM(s) Oral daily  polyethylene glycol 3350 17 Gram(s) Oral daily  sodium chloride 0.45%. 1000 milliLiter(s) (50 mL/Hr) IV Continuous <Continuous>    MEDICATIONS  (PRN):  acetaminophen   Tablet 650 milliGRAM(s) Oral every 6 hours PRN For Temp over 38.3 C (100.94 F)  ALPRAZolam 0.5 milliGRAM(s) Oral every 12 hours PRN Anxiety  aluminum hydroxide/magnesium hydroxide/simethicone Suspension 30 milliLiter(s) Oral four times a day PRN Indigestion  glucagon  Injectable 1 milliGRAM(s) IntraMuscular once PRN Glucose LESS THAN 70 milligrams/deciliter  magnesium hydroxide Suspension 30 milliLiter(s) Oral daily PRN Constipation  naloxone Injectable 0.1 milliGRAM(s) IV Push every 3 minutes PRN For ANY of the following changes in patient status:  A. RR LESS THAN 10 breaths per minute, B. Oxygen saturation LESS THAN 90%, C. Sedation score of 6  ondansetron Injectable 4 milliGRAM(s) IV Push every 6 hours PRN Nausea  ondansetron Injectable 4 milliGRAM(s) IV Push every 6 hours PRN Nausea and/or Vomiting      Allergies    No Known Allergies    Intolerances        LABS:                        10.6   10.4  )-----------( 190      ( 25 Oct 2017 08:24 )             32.1     10-25    137  |  105  |  20<H>  ----------------------------<  139<H>  3.9   |  23  |  1.36<H>    Ca    8.8      25 Oct 2017 08:24  Phos  3.4     10-24  Mg     1.3     10-24                CAPILLARY BLOOD GLUCOSE      POCT Blood Glucose.: 181 mg/dL (25 Oct 2017 09:38)  POCT Blood Glucose.: 151 mg/dL (24 Oct 2017 22:04)  POCT Blood Glucose.: 142 mg/dL (24 Oct 2017 16:35)        RADIOLOGY & ADDITIONAL TESTS:    CXR:    Ct scan chest:    ekg;    echo:
pt seen in icu [  ], reg med floor [  x ], bed [x ], chair at bedside [   ]  Awake, alert,comfortable in bed in NAD.  REVIEW OF SYSTEMS:    CONSTITUTIONAL: No weakness, fevers or chills  EYES/ENT: No visual changes;  No vertigo or throat pain   NECK: No pain or stiffness  RESPIRATORY: No cough, wheezing, hemoptysis; No shortness of breath  CARDIOVASCULAR: No chest pain or palpitations  GASTROINTESTINAL: No abdominal or epigastric pain. No nausea, vomiting, or hematemesis; No diarrhea or constipation. No melena or hematochezia.  GENITOURINARY: No dysuria, frequency or hematuria  NEUROLOGICAL: No numbness or weakness  SKIN: No itching, burning, rashes, or lesions   All other review of systems is negative unless indicated above.    Physical Exam    General: WN/WD NAD  Neurology: A&Ox3, nonfocal, HALL x 4  Respiratory: CTA B/L  CV: RRR, S1S2, no murmurs, rubs or gallops  Abdominal: Soft, NT, ND +BS, Last BM  Extremities: Swelling and ecchymosis of left leg      Allergies  No Known Allergies      Health Issues  Primary osteoarthritis of left knee  CHRISTOPHE (obstructive sleep apnea)  Morbid obesity with BMI of 40.0-44.9, adult  OA (osteoarthritis)  CVA (cerebral vascular accident)  Hypothyroid  Arthritis  Insomnia  Depression  Hyperlipidemia  Hypertension  Diabetes  Obesity (BMI 30-39.9)  H/O total knee replacement, right  S/P bilateral cataract extraction  S/P vaginopexy      Vitals  T(F): 98.6 (10-26-17 @ 05:41), Max: 100.6 (10-26-17 @ 00:44)  HR: 72 (10-26-17 @ 05:41) (72 - 97)  BP: 116/57 (10-26-17 @ 05:41) (116/57 - 156/82)  RR: 17 (10-26-17 @ 05:41) (16 - 17)  SpO2: 97% (10-26-17 @ 05:41) (95% - 100%)  Wt(kg): --  CAPILLARY BLOOD GLUCOSE      POCT Blood Glucose.: 118 mg/dL (26 Oct 2017 07:46)      Labs                          9.4    9.0   )-----------( 140      ( 26 Oct 2017 05:50 )             27.6       10-26    137  |  107  |  16  ----------------------------<  120<H>  3.4<L>   |  22  |  1.29    Ca    8.4      26 Oct 2017 05:50  Phos  3.4     10-24  Mg     1.3     10-24              Radiology Results      Meds    MEDICATIONS  (STANDING):  amLODIPine   Tablet 10 milliGRAM(s) Oral daily  ascorbic acid 500 milliGRAM(s) Oral two times a day  aspirin  chewable 81 milliGRAM(s) Oral daily  atorvastatin 20 milliGRAM(s) Oral at bedtime  dextrose 5%. 1000 milliLiter(s) (50 mL/Hr) IV Continuous <Continuous>  dextrose 50% Injectable 25 Gram(s) IV Push once  dextrose 50% Injectable 25 Gram(s) IV Push once  docusate sodium 100 milliGRAM(s) Oral three times a day  enoxaparin Injectable 30 milliGRAM(s) SubCutaneous every 12 hours  folic acid 1 milliGRAM(s) Oral daily  gabapentin 100 milliGRAM(s) Oral every 12 hours  hydrOXYzine hydrochloride 25 milliGRAM(s) Oral two times a day  insulin lispro (HumaLOG) corrective regimen sliding scale   SubCutaneous three times a day before meals  losartan 100 milliGRAM(s) Oral daily  metoprolol 25 milliGRAM(s) Oral daily  PARoxetine 10 milliGRAM(s) Oral daily  polyethylene glycol 3350 17 Gram(s) Oral daily  sodium chloride 0.45%. 1000 milliLiter(s) (50 mL/Hr) IV Continuous <Continuous>  traMADol 50 milliGRAM(s) Oral every 12 hours      MEDICATIONS  (PRN):  acetaminophen   Tablet 650 milliGRAM(s) Oral every 6 hours PRN For Temp over 38.3 C (100.94 F)  ALPRAZolam 0.5 milliGRAM(s) Oral every 12 hours PRN Anxiety  aluminum hydroxide/magnesium hydroxide/simethicone Suspension 30 milliLiter(s) Oral four times a day PRN Indigestion  glucagon  Injectable 1 milliGRAM(s) IntraMuscular once PRN Glucose LESS THAN 70 milligrams/deciliter  magnesium hydroxide Suspension 30 milliLiter(s) Oral daily PRN Constipation  ondansetron Injectable 4 milliGRAM(s) IV Push every 6 hours PRN Nausea and/or Vomiting  oxyCODONE    IR 5 milliGRAM(s) Oral every 6 hours PRN Severe Pain (7 - 10)

## 2017-11-08 DIAGNOSIS — E03.9 HYPOTHYROIDISM, UNSPECIFIED: ICD-10-CM

## 2017-11-08 DIAGNOSIS — Z96.651 PRESENCE OF RIGHT ARTIFICIAL KNEE JOINT: ICD-10-CM

## 2017-11-08 DIAGNOSIS — M17.12 UNILATERAL PRIMARY OSTEOARTHRITIS, LEFT KNEE: ICD-10-CM

## 2017-11-08 DIAGNOSIS — I10 ESSENTIAL (PRIMARY) HYPERTENSION: ICD-10-CM

## 2017-11-08 DIAGNOSIS — Z28.21 IMMUNIZATION NOT CARRIED OUT BECAUSE OF PATIENT REFUSAL: ICD-10-CM

## 2017-11-08 DIAGNOSIS — I50.30 UNSPECIFIED DIASTOLIC (CONGESTIVE) HEART FAILURE: ICD-10-CM

## 2017-11-08 DIAGNOSIS — E11.9 TYPE 2 DIABETES MELLITUS WITHOUT COMPLICATIONS: ICD-10-CM

## 2017-11-08 DIAGNOSIS — Z79.84 LONG TERM (CURRENT) USE OF ORAL HYPOGLYCEMIC DRUGS: ICD-10-CM

## 2017-11-08 DIAGNOSIS — Z98.42 CATARACT EXTRACTION STATUS, LEFT EYE: ICD-10-CM

## 2017-11-08 DIAGNOSIS — E66.01 MORBID (SEVERE) OBESITY DUE TO EXCESS CALORIES: ICD-10-CM

## 2017-11-08 DIAGNOSIS — Z98.41 CATARACT EXTRACTION STATUS, RIGHT EYE: ICD-10-CM

## 2017-11-08 DIAGNOSIS — I11.0 HYPERTENSIVE HEART DISEASE WITH HEART FAILURE: ICD-10-CM

## 2017-11-08 DIAGNOSIS — I69.392 FACIAL WEAKNESS FOLLOWING CEREBRAL INFARCTION: ICD-10-CM

## 2017-11-08 DIAGNOSIS — Z79.82 LONG TERM (CURRENT) USE OF ASPIRIN: ICD-10-CM

## 2017-11-08 DIAGNOSIS — E78.5 HYPERLIPIDEMIA, UNSPECIFIED: ICD-10-CM

## 2017-11-08 DIAGNOSIS — G47.00 INSOMNIA, UNSPECIFIED: ICD-10-CM

## 2017-11-08 DIAGNOSIS — G89.18 OTHER ACUTE POSTPROCEDURAL PAIN: ICD-10-CM

## 2017-11-08 DIAGNOSIS — G47.33 OBSTRUCTIVE SLEEP APNEA (ADULT) (PEDIATRIC): ICD-10-CM

## 2017-11-08 DIAGNOSIS — F32.9 MAJOR DEPRESSIVE DISORDER, SINGLE EPISODE, UNSPECIFIED: ICD-10-CM

## 2017-11-08 DIAGNOSIS — E87.6 HYPOKALEMIA: ICD-10-CM

## 2017-11-08 DIAGNOSIS — Z82.49 FAMILY HISTORY OF ISCHEMIC HEART DISEASE AND OTHER DISEASES OF THE CIRCULATORY SYSTEM: ICD-10-CM

## 2018-01-16 NOTE — ED PROVIDER NOTE - CONDUCTED A DETAILED DISCUSSION WITH PATIENT AND/OR GUARDIAN REGARDING, MDM
Message   Recorded as Task   Date: 04/18/2017 03:39 PM, Created By: Bettye Montilla   Task Name: Call Back   Assigned To: [facility]   Regarding Patient: Meggan Schuler, Status: In Progress   Comment:    Teetee Torres L - 18 Apr 2017 3:39 PM     TASK CREATED  [facility]- patient lmmark requesting a script to go to [facility]  Please call patient S-Sukhdeep 15 - [facility] new order placed   Leisa Amos - [id number] to (Evaluate:19Feb2017)  Requested for: 21Dec2016; Last   Rx:21Dec2016 AS DIRECTED; Therapy: [id number] to (Evaluate:01Rxg1740)  Requested for: [id number]; [id number] to (Evaluate:62Xnl8546); Last Rx:19Aug2016 Ordered   8  Lumigan 0 01 % Ophthalmic Solution; INSTILL 1 DROP INTO BOTH EYES ONCE DAILY   IN THE EVENING; Therapy: (Recorded:[id number]) to Recorded   9  Nystatin-Triamcinolone 531388-7 1 UNIT/GM-% External Ointment; APPLY SPARINGLY   TO AFFECTED AREA TWICE A DAY FOR 7   TO 14 DAYS; Therapy: 51RTG7465 to (Last Rx:44Mhh3769)  Requested for: 12JBL0282 Ordered   10  Oxycodone-Acetaminophen 5-325 MG Oral Tablet; TAKE 1 TABLET TWICE DAILY AS    NEEDED FOR PAIN;    Therapy: 00VKL0291 AS NEEDED; Therapy: 22FJT4434 to (Evaluate:69Ppo1221); Last Rx:17Jan2017 Ordered   13  VESIcare 10 MG Oral Tablet; Take 1 tablet daily; Therapy: 99INT4729 to (Jeff Todd)  Requested for: 64EJC9329; Last    Rx:[id number] Ordered   14  Vitamin D 2000 UNIT Oral Capsule Recorded   15  Xarelto 20 MG Oral Tablet; Take 1 tablet daily; Therapy: 90CZQ6825 to (Evaluate:74Sie7201)  Requested for: 94Dyn5211; Last    Rx:[id number] return to ED if symptoms worsen, persist or questions arise

## 2018-02-13 NOTE — H&P PST ADULT - NEGATIVE CARDIOVASCULAR SYMPTOMS
no change
no orthopnea/no paroxysmal nocturnal dyspnea/no peripheral edema/no palpitations/no claudication/no dyspnea on exertion/no chest pain

## 2019-02-14 NOTE — PATIENT PROFILE ADULT. - NS PRO ABUSE SCREEN AFRAID ANYONE YN
89 yo F with PMH of gastric cancer, s/p gastrectomy + radiology (14 yrs ago), esophagus stricture (11 yrs ago), hypothyroidism p/w difficulty swallowing, decreased appetite x 2wks  gentle hydration   le edema increase bnp doubt systolic chf  Med consult dr thurston  gi consult dr adorno  dvt prophylaxis  ct scan abdomen and pelvis  tsh  dvt prophylaxis  ?doppler r/o dvt
no

## 2020-01-21 NOTE — DISCHARGE NOTE ADULT - NSCORESITESY/N_GEN_A_CORE_RD
Yes
Quality 130: Documentation Of Current Medications In The Medical Record: Current Medications Documented
Detail Level: Detailed
Quality 226: Preventive Care And Screening: Tobacco Use: Screening And Cessation Intervention: Patient screened for tobacco use and is an ex/non-smoker
Quality 431: Preventive Care And Screening: Unhealthy Alcohol Use - Screening: Patient screened for unhealthy alcohol use using a single question and scores less than 2 times per year

## 2020-04-01 ENCOUNTER — OUTPATIENT (OUTPATIENT)
Dept: OUTPATIENT SERVICES | Facility: HOSPITAL | Age: 75
LOS: 1 days | End: 2020-04-01
Payer: MEDICAID

## 2020-04-03 ENCOUNTER — INPATIENT (INPATIENT)
Facility: HOSPITAL | Age: 75
LOS: 10 days | Discharge: HOME HEALTH | End: 2020-04-14
Attending: STUDENT IN AN ORGANIZED HEALTH CARE EDUCATION/TRAINING PROGRAM | Admitting: STUDENT IN AN ORGANIZED HEALTH CARE EDUCATION/TRAINING PROGRAM
Payer: MEDICAID

## 2020-04-03 VITALS
DIASTOLIC BLOOD PRESSURE: 100 MMHG | RESPIRATION RATE: 30 BRPM | SYSTOLIC BLOOD PRESSURE: 144 MMHG | OXYGEN SATURATION: 61 % | TEMPERATURE: 98 F | HEART RATE: 77 BPM

## 2020-04-03 DIAGNOSIS — R06.02 SHORTNESS OF BREATH: ICD-10-CM

## 2020-04-03 LAB
ALBUMIN SERPL ELPH-MCNC: 3.5 G/DL — SIGNIFICANT CHANGE UP (ref 3.3–5)
ALP SERPL-CCNC: 52 U/L — SIGNIFICANT CHANGE UP (ref 40–120)
ALT FLD-CCNC: 35 U/L — HIGH (ref 4–33)
ANION GAP SERPL CALC-SCNC: 16 MMO/L — HIGH (ref 7–14)
APTT BLD: 31.2 SEC — SIGNIFICANT CHANGE UP (ref 27.5–36.3)
AST SERPL-CCNC: 58 U/L — HIGH (ref 4–32)
BASE EXCESS BLDV CALC-SCNC: -0.6 MMOL/L — SIGNIFICANT CHANGE UP
BASOPHILS # BLD AUTO: 0.01 K/UL — SIGNIFICANT CHANGE UP (ref 0–0.2)
BASOPHILS NFR BLD AUTO: 0.1 % — SIGNIFICANT CHANGE UP (ref 0–2)
BILIRUB SERPL-MCNC: 0.6 MG/DL — SIGNIFICANT CHANGE UP (ref 0.2–1.2)
BLOOD GAS VENOUS - CREATININE: 1.41 MG/DL — HIGH (ref 0.5–1.3)
BLOOD GAS VENOUS - FIO2: 21 — SIGNIFICANT CHANGE UP
BUN SERPL-MCNC: 25 MG/DL — HIGH (ref 7–23)
CALCIUM SERPL-MCNC: 9.2 MG/DL — SIGNIFICANT CHANGE UP (ref 8.4–10.5)
CHLORIDE BLDV-SCNC: 102 MMOL/L — SIGNIFICANT CHANGE UP (ref 96–108)
CHLORIDE SERPL-SCNC: 97 MMOL/L — LOW (ref 98–107)
CO2 SERPL-SCNC: 20 MMOL/L — LOW (ref 22–31)
CREAT SERPL-MCNC: 1.45 MG/DL — HIGH (ref 0.5–1.3)
EOSINOPHIL # BLD AUTO: 0 K/UL — SIGNIFICANT CHANGE UP (ref 0–0.5)
EOSINOPHIL NFR BLD AUTO: 0 % — SIGNIFICANT CHANGE UP (ref 0–6)
FERRITIN SERPL-MCNC: 862.7 NG/ML — HIGH (ref 15–150)
GAS PNL BLDV: 133 MMOL/L — LOW (ref 136–146)
GLUCOSE BLDV-MCNC: 154 MG/DL — HIGH (ref 70–99)
GLUCOSE SERPL-MCNC: 170 MG/DL — HIGH (ref 70–99)
HCO3 BLDV-SCNC: 23 MMOL/L — SIGNIFICANT CHANGE UP (ref 20–27)
HCT VFR BLD CALC: 36.9 % — SIGNIFICANT CHANGE UP (ref 34.5–45)
HCT VFR BLDV CALC: 38 % — SIGNIFICANT CHANGE UP (ref 34.5–45)
HGB BLD-MCNC: 13.1 G/DL — SIGNIFICANT CHANGE UP (ref 11.5–15.5)
HGB BLDV-MCNC: 12.4 G/DL — SIGNIFICANT CHANGE UP (ref 11.5–15.5)
IMM GRANULOCYTES NFR BLD AUTO: 1 % — SIGNIFICANT CHANGE UP (ref 0–1.5)
INR BLD: 1.03 — SIGNIFICANT CHANGE UP (ref 0.88–1.17)
LACTATE BLDV-MCNC: 3.2 MMOL/L — HIGH (ref 0.5–2)
LDH SERPL L TO P-CCNC: 672 U/L — HIGH (ref 135–225)
LYMPHOCYTES # BLD AUTO: 0.91 K/UL — LOW (ref 1–3.3)
LYMPHOCYTES # BLD AUTO: 12.9 % — LOW (ref 13–44)
MCHC RBC-ENTMCNC: 32 PG — SIGNIFICANT CHANGE UP (ref 27–34)
MCHC RBC-ENTMCNC: 35.5 % — SIGNIFICANT CHANGE UP (ref 32–36)
MCV RBC AUTO: 90 FL — SIGNIFICANT CHANGE UP (ref 80–100)
MONOCYTES # BLD AUTO: 0.58 K/UL — SIGNIFICANT CHANGE UP (ref 0–0.9)
MONOCYTES NFR BLD AUTO: 8.2 % — SIGNIFICANT CHANGE UP (ref 2–14)
NEUTROPHILS # BLD AUTO: 5.47 K/UL — SIGNIFICANT CHANGE UP (ref 1.8–7.4)
NEUTROPHILS NFR BLD AUTO: 77.8 % — HIGH (ref 43–77)
NRBC # FLD: 0 K/UL — SIGNIFICANT CHANGE UP (ref 0–0)
PCO2 BLDV: 38 MMHG — LOW (ref 41–51)
PH BLDV: 7.41 PH — SIGNIFICANT CHANGE UP (ref 7.32–7.43)
PLATELET # BLD AUTO: 189 K/UL — SIGNIFICANT CHANGE UP (ref 150–400)
PMV BLD: 10.4 FL — SIGNIFICANT CHANGE UP (ref 7–13)
PO2 BLDV: 36 MMHG — SIGNIFICANT CHANGE UP (ref 35–40)
POTASSIUM BLDV-SCNC: 3 MMOL/L — LOW (ref 3.4–4.5)
POTASSIUM SERPL-MCNC: 3.8 MMOL/L — SIGNIFICANT CHANGE UP (ref 3.5–5.3)
POTASSIUM SERPL-SCNC: 3.8 MMOL/L — SIGNIFICANT CHANGE UP (ref 3.5–5.3)
PROCALCITONIN SERPL-MCNC: 0.33 NG/ML — HIGH (ref 0.02–0.1)
PROT SERPL-MCNC: 8 G/DL — SIGNIFICANT CHANGE UP (ref 6–8.3)
PROTHROM AB SERPL-ACNC: 11.8 SEC — SIGNIFICANT CHANGE UP (ref 9.8–13.1)
RBC # BLD: 4.1 M/UL — SIGNIFICANT CHANGE UP (ref 3.8–5.2)
RBC # FLD: 12.5 % — SIGNIFICANT CHANGE UP (ref 10.3–14.5)
SAO2 % BLDV: 64.9 % — SIGNIFICANT CHANGE UP (ref 60–85)
SODIUM SERPL-SCNC: 133 MMOL/L — LOW (ref 135–145)
TROPONIN T, HIGH SENSITIVITY: 17 NG/L — SIGNIFICANT CHANGE UP (ref ?–14)
WBC # BLD: 7.04 K/UL — SIGNIFICANT CHANGE UP (ref 3.8–10.5)
WBC # FLD AUTO: 7.04 K/UL — SIGNIFICANT CHANGE UP (ref 3.8–10.5)

## 2020-04-03 PROCEDURE — 99223 1ST HOSP IP/OBS HIGH 75: CPT | Mod: AI

## 2020-04-03 PROCEDURE — 71045 X-RAY EXAM CHEST 1 VIEW: CPT | Mod: 26

## 2020-04-03 NOTE — ED ADULT TRIAGE NOTE - CHIEF COMPLAINT QUOTE
pt c/o sob and difficulty breathing. pt restless. palced on 100% NRB for ra sat= 66. charge RN called

## 2020-04-03 NOTE — ED PROVIDER NOTE - OBJECTIVE STATEMENT
(Sai - son)  75 y/o F PMH HTN, DM, hypothyroidism c/o fever, chills, bodyaches, cough x 3 weeks, worsening with sob and cp past few days. Endorses decreased PO intake and diarrhea past few days. Denies le edema, abdominal pain, n/v, dysuria.

## 2020-04-03 NOTE — H&P ADULT - NSHPPHYSICALEXAM_GEN_ALL_CORE
Adopted from ED note:    · CONSTITUTIONAL: - - -  · Appearance: ILL APPEARING  · Development: well developed  · Distress: MILD  · Manner: appropriate for situation  · Mentation: awake, alert, oriented to person, place, time/situation  · Mood: appropriate  · ENMT: Airway patent  · CARDIAC: Normal rate, regular rhythm.  Heart sounds S1, S2.  No murmurs, rubs or gallops.  · RESPIRATORY: - - -  · Respiratory Distress: no  · Breath Sounds: RALES  · Rales: LEFT LOWER LOBE  · Chest Exam: normal  · GASTROINTESTINAL: Abdomen soft, non-tender, no guarding.  · MUSCULOSKELETAL: Spine appears normal, range of motion is not limited, no muscle or joint tenderness  · SKIN: Skin normal color for race, warm, dry and intact. No evidence of rash.

## 2020-04-03 NOTE — H&P ADULT - NSICDXPASTMEDICALHX_GEN_ALL_CORE_FT
PAST MEDICAL HISTORY:  Arthritis     CVA (cerebral vascular accident) facial weakness  2011    Depression     Diabetes x 4 years, controlled    DM (diabetes mellitus)     HTN (hypertension)     Hyperlipidemia     Hypertension x 25 years, controlled    Hypothyroid     Hypothyroid     Insomnia     Morbid obesity with BMI of 40.0-44.9, adult     OA (osteoarthritis)     CHRISTOPHE (obstructive sleep apnea) by criteria

## 2020-04-03 NOTE — H&P ADULT - PROBLEM SELECTOR PLAN 1
Likely COVID 19 infection  - COVID 19 PCR pending  - O2 via NRB.  Titrate down as tolerated  - if clinically worsens, will start methylprednisolone

## 2020-04-03 NOTE — ED PROVIDER NOTE - CLINICAL SUMMARY MEDICAL DECISION MAKING FREE TEXT BOX
pt with fever, chills, bodyaches, sob, cp, cough; hypoxic in 60's on RA, sating 90's on 15L nrb; suspect covid  -covid panel, cxr, likely admit

## 2020-04-03 NOTE — H&P ADULT - NSHPREVIEWOFSYSTEMS_GEN_ALL_CORE
Adopted from ED note    · CONSTITUTIONAL: - - -  · Constitutional [+]: CHILLS, FEVER  · CARDIOVASCULAR: - - -  · Cardiovascular [+]: CHEST PAIN  · RESPIRATORY: - - -  · Respiratory [+]: COUGH, SHORTNESS OF BREATH  · GASTROINTESTINAL: - - -  · Gastrointestinal [+]: DIARRHEA  · ROS STATEMENT: all other ROS negative except as per HPI

## 2020-04-03 NOTE — ED ADULT NURSE NOTE - OBJECTIVE STATEMENT
Oliverio RN: Pt presents to room 24 with fever, chills, body aches and SOB, sating at 66% on room air, placed on non-rebreather sating at 97%. Pt a&ox3 and wheelchair bound, respirations even and labored, abd soft and non-distended, non-tender to palpation. As per MD Vazquez acting as , pt endorses 2days of cough and worsening SOB. 20G placed in left hand, labs drawn, will endorse report to primary RN, Daniel.

## 2020-04-03 NOTE — ED PROVIDER NOTE - ATTENDING CONTRIBUTION TO CARE
I performed a face-to-face evaluation of the patient and performed a history and physical examination. I agree with the history and physical examination.    F, cough, SOB, chest tightness, hypoxia, tachypnea. DM, HTN. No LE edema. Concern for COVID PNA. Eval also for ACS. Labs, EKG, CXR, admit.

## 2020-04-03 NOTE — H&P ADULT - ASSESSMENT
73 y/o F with DM, hypothyroidism, CVA, depression, osteoarthritis, p/w fever, chills, cough, and dyspnea with severe hypoxia in ED.

## 2020-04-03 NOTE — H&P ADULT - HISTORY OF PRESENT ILLNESS
73 y/o F with DM, hypothyroidism, CVA, depression, osteoarthritis, p/w fever, chills, cough, and dyspnea.  Pt's son states that pt developed symptoms about 1 week ago with cough, and fevers.  She has had body aches, and diarrhea.  She developed worsening dyspnea yesterday, and dyspnea became severe today.  No known sick contacts.      In the ED, pt 75 y/o F with DM, hypothyroidism, CVA, depression, osteoarthritis, p/w fever, chills, cough, and dyspnea.  Pt's son states that pt developed symptoms about 1 week ago with cough, and fevers.  She has had body aches, and diarrhea.  She developed worsening dyspnea yesterday, and dyspnea became severe today.  No known sick contacts.      In the ED, pt had O2 sat of 61% on RA and was started on NRB with increase to 96%.    Pt presently states she is feeling "better", but still SOB.

## 2020-04-04 DIAGNOSIS — E11.9 TYPE 2 DIABETES MELLITUS WITHOUT COMPLICATIONS: ICD-10-CM

## 2020-04-04 DIAGNOSIS — I10 ESSENTIAL (PRIMARY) HYPERTENSION: ICD-10-CM

## 2020-04-04 DIAGNOSIS — J96.01 ACUTE RESPIRATORY FAILURE WITH HYPOXIA: ICD-10-CM

## 2020-04-04 DIAGNOSIS — E03.9 HYPOTHYROIDISM, UNSPECIFIED: ICD-10-CM

## 2020-04-04 PROBLEM — E66.01 MORBID (SEVERE) OBESITY DUE TO EXCESS CALORIES: Chronic | Status: ACTIVE | Noted: 2017-10-11

## 2020-04-04 PROBLEM — G47.33 OBSTRUCTIVE SLEEP APNEA (ADULT) (PEDIATRIC): Chronic | Status: ACTIVE | Noted: 2017-10-11

## 2020-04-04 PROBLEM — I63.9 CEREBRAL INFARCTION, UNSPECIFIED: Chronic | Status: ACTIVE | Noted: 2017-10-11

## 2020-04-04 PROBLEM — M19.90 UNSPECIFIED OSTEOARTHRITIS, UNSPECIFIED SITE: Chronic | Status: ACTIVE | Noted: 2017-10-11

## 2020-04-04 LAB
ALBUMIN SERPL ELPH-MCNC: 3.5 G/DL — SIGNIFICANT CHANGE UP (ref 3.3–5)
ALP SERPL-CCNC: 51 U/L — SIGNIFICANT CHANGE UP (ref 40–120)
ALT FLD-CCNC: 26 U/L — SIGNIFICANT CHANGE UP (ref 4–33)
ANION GAP SERPL CALC-SCNC: 13 MMO/L — SIGNIFICANT CHANGE UP (ref 7–14)
AST SERPL-CCNC: 48 U/L — HIGH (ref 4–32)
BASOPHILS # BLD AUTO: 0.01 K/UL — SIGNIFICANT CHANGE UP (ref 0–0.2)
BASOPHILS NFR BLD AUTO: 0.1 % — SIGNIFICANT CHANGE UP (ref 0–2)
BASOPHILS NFR SPEC: 0 % — SIGNIFICANT CHANGE UP (ref 0–2)
BILIRUB SERPL-MCNC: 0.6 MG/DL — SIGNIFICANT CHANGE UP (ref 0.2–1.2)
BLASTS # FLD: 0 % — SIGNIFICANT CHANGE UP (ref 0–0)
BUN SERPL-MCNC: 24 MG/DL — HIGH (ref 7–23)
CALCIUM SERPL-MCNC: 9.6 MG/DL — SIGNIFICANT CHANGE UP (ref 8.4–10.5)
CHLORIDE SERPL-SCNC: 95 MMOL/L — LOW (ref 98–107)
CO2 SERPL-SCNC: 24 MMOL/L — SIGNIFICANT CHANGE UP (ref 22–31)
CREAT SERPL-MCNC: 1.35 MG/DL — HIGH (ref 0.5–1.3)
EOSINOPHIL # BLD AUTO: 0 K/UL — SIGNIFICANT CHANGE UP (ref 0–0.5)
EOSINOPHIL NFR BLD AUTO: 0 % — SIGNIFICANT CHANGE UP (ref 0–6)
EOSINOPHIL NFR FLD: 0 % — SIGNIFICANT CHANGE UP (ref 0–6)
GIANT PLATELETS BLD QL SMEAR: PRESENT — SIGNIFICANT CHANGE UP
GLUCOSE BLDC GLUCOMTR-MCNC: 124 MG/DL — HIGH (ref 70–99)
GLUCOSE BLDC GLUCOMTR-MCNC: 129 MG/DL — HIGH (ref 70–99)
GLUCOSE BLDC GLUCOMTR-MCNC: 167 MG/DL — HIGH (ref 70–99)
GLUCOSE BLDC GLUCOMTR-MCNC: 170 MG/DL — HIGH (ref 70–99)
GLUCOSE BLDC GLUCOMTR-MCNC: 187 MG/DL — HIGH (ref 70–99)
GLUCOSE SERPL-MCNC: 129 MG/DL — HIGH (ref 70–99)
HCT VFR BLD CALC: 36.5 % — SIGNIFICANT CHANGE UP (ref 34.5–45)
HCV AB S/CO SERPL IA: 0.16 S/CO — SIGNIFICANT CHANGE UP (ref 0–0.99)
HCV AB SERPL-IMP: SIGNIFICANT CHANGE UP
HGB BLD-MCNC: 13.1 G/DL — SIGNIFICANT CHANGE UP (ref 11.5–15.5)
IMM GRANULOCYTES NFR BLD AUTO: 0.5 % — SIGNIFICANT CHANGE UP (ref 0–1.5)
LYMPHOCYTES # BLD AUTO: 1.14 K/UL — SIGNIFICANT CHANGE UP (ref 1–3.3)
LYMPHOCYTES # BLD AUTO: 15.3 % — SIGNIFICANT CHANGE UP (ref 13–44)
LYMPHOCYTES NFR SPEC AUTO: 7.9 % — LOW (ref 13–44)
MCHC RBC-ENTMCNC: 32.1 PG — SIGNIFICANT CHANGE UP (ref 27–34)
MCHC RBC-ENTMCNC: 35.9 % — SIGNIFICANT CHANGE UP (ref 32–36)
MCV RBC AUTO: 89.5 FL — SIGNIFICANT CHANGE UP (ref 80–100)
METAMYELOCYTES # FLD: 0 % — SIGNIFICANT CHANGE UP (ref 0–1)
MONOCYTES # BLD AUTO: 0.55 K/UL — SIGNIFICANT CHANGE UP (ref 0–0.9)
MONOCYTES NFR BLD AUTO: 7.4 % — SIGNIFICANT CHANGE UP (ref 2–14)
MONOCYTES NFR BLD: 4.4 % — SIGNIFICANT CHANGE UP (ref 2–9)
MYELOCYTES NFR BLD: 0 % — SIGNIFICANT CHANGE UP (ref 0–0)
NEUTROPHIL AB SER-ACNC: 84.2 % — HIGH (ref 43–77)
NEUTROPHILS # BLD AUTO: 5.73 K/UL — SIGNIFICANT CHANGE UP (ref 1.8–7.4)
NEUTROPHILS NFR BLD AUTO: 76.7 % — SIGNIFICANT CHANGE UP (ref 43–77)
NEUTS BAND # BLD: 0 % — SIGNIFICANT CHANGE UP (ref 0–6)
NRBC # FLD: 0 K/UL — SIGNIFICANT CHANGE UP (ref 0–0)
OTHER - HEMATOLOGY %: 0 — SIGNIFICANT CHANGE UP
PLATELET # BLD AUTO: 209 K/UL — SIGNIFICANT CHANGE UP (ref 150–400)
PLATELET COUNT - ESTIMATE: NORMAL — SIGNIFICANT CHANGE UP
PMV BLD: 9.8 FL — SIGNIFICANT CHANGE UP (ref 7–13)
POTASSIUM SERPL-MCNC: 3.6 MMOL/L — SIGNIFICANT CHANGE UP (ref 3.5–5.3)
POTASSIUM SERPL-SCNC: 3.6 MMOL/L — SIGNIFICANT CHANGE UP (ref 3.5–5.3)
PROMYELOCYTES # FLD: 0 % — SIGNIFICANT CHANGE UP (ref 0–0)
PROT SERPL-MCNC: 7.7 G/DL — SIGNIFICANT CHANGE UP (ref 6–8.3)
RBC # BLD: 4.08 M/UL — SIGNIFICANT CHANGE UP (ref 3.8–5.2)
RBC # FLD: 12.4 % — SIGNIFICANT CHANGE UP (ref 10.3–14.5)
SARS-COV-2 RNA SPEC QL NAA+PROBE: DETECTED
SODIUM SERPL-SCNC: 132 MMOL/L — LOW (ref 135–145)
TROPONIN T, HIGH SENSITIVITY: 16 NG/L — SIGNIFICANT CHANGE UP (ref ?–14)
VARIANT LYMPHS # BLD: 2.6 % — SIGNIFICANT CHANGE UP
WBC # BLD: 7.47 K/UL — SIGNIFICANT CHANGE UP (ref 3.8–10.5)
WBC # FLD AUTO: 7.47 K/UL — SIGNIFICANT CHANGE UP (ref 3.8–10.5)

## 2020-04-04 PROCEDURE — 99233 SBSQ HOSP IP/OBS HIGH 50: CPT

## 2020-04-04 RX ORDER — DEXTROSE 50 % IN WATER 50 %
25 SYRINGE (ML) INTRAVENOUS ONCE
Refills: 0 | Status: DISCONTINUED | OUTPATIENT
Start: 2020-04-04 | End: 2020-04-14

## 2020-04-04 RX ORDER — ZOLPIDEM TARTRATE 10 MG/1
1 TABLET ORAL
Qty: 0 | Refills: 0 | DISCHARGE

## 2020-04-04 RX ORDER — HYDROXYCHLOROQUINE SULFATE 200 MG
400 TABLET ORAL EVERY 12 HOURS
Refills: 0 | Status: COMPLETED | OUTPATIENT
Start: 2020-04-04 | End: 2020-04-05

## 2020-04-04 RX ORDER — DOCUSATE SODIUM 100 MG
1 CAPSULE ORAL
Qty: 0 | Refills: 0 | DISCHARGE

## 2020-04-04 RX ORDER — ACETAMINOPHEN 500 MG
650 TABLET ORAL EVERY 4 HOURS
Refills: 0 | Status: DISCONTINUED | OUTPATIENT
Start: 2020-04-04 | End: 2020-04-14

## 2020-04-04 RX ORDER — ZINC SULFATE TAB 220 MG (50 MG ZINC EQUIVALENT) 220 (50 ZN) MG
220 TAB ORAL DAILY
Refills: 0 | Status: DISCONTINUED | OUTPATIENT
Start: 2020-04-04 | End: 2020-04-14

## 2020-04-04 RX ORDER — HYDROCHLOROTHIAZIDE 25 MG
25 TABLET ORAL DAILY
Refills: 0 | Status: DISCONTINUED | OUTPATIENT
Start: 2020-04-04 | End: 2020-04-14

## 2020-04-04 RX ORDER — HYDROXYCHLOROQUINE SULFATE 200 MG
200 TABLET ORAL EVERY 12 HOURS
Refills: 0 | Status: DISCONTINUED | OUTPATIENT
Start: 2020-04-05 | End: 2020-04-08

## 2020-04-04 RX ORDER — AMLODIPINE BESYLATE 2.5 MG/1
1 TABLET ORAL
Qty: 0 | Refills: 0 | DISCHARGE

## 2020-04-04 RX ORDER — ENOXAPARIN SODIUM 100 MG/ML
40 INJECTION SUBCUTANEOUS DAILY
Refills: 0 | Status: DISCONTINUED | OUTPATIENT
Start: 2020-04-04 | End: 2020-04-14

## 2020-04-04 RX ORDER — METOPROLOL TARTRATE 50 MG
25 TABLET ORAL DAILY
Refills: 0 | Status: DISCONTINUED | OUTPATIENT
Start: 2020-04-04 | End: 2020-04-14

## 2020-04-04 RX ORDER — LEVOTHYROXINE SODIUM 125 MCG
1 TABLET ORAL
Qty: 0 | Refills: 0 | DISCHARGE

## 2020-04-04 RX ORDER — HYDROXYZINE HCL 10 MG
1 TABLET ORAL
Qty: 0 | Refills: 0 | DISCHARGE

## 2020-04-04 RX ORDER — INSULIN LISPRO 100/ML
VIAL (ML) SUBCUTANEOUS
Refills: 0 | Status: DISCONTINUED | OUTPATIENT
Start: 2020-04-04 | End: 2020-04-13

## 2020-04-04 RX ORDER — LOSARTAN POTASSIUM 100 MG/1
1 TABLET, FILM COATED ORAL
Qty: 0 | Refills: 0 | DISCHARGE

## 2020-04-04 RX ORDER — LOSARTAN POTASSIUM 100 MG/1
100 TABLET, FILM COATED ORAL DAILY
Refills: 0 | Status: DISCONTINUED | OUTPATIENT
Start: 2020-04-04 | End: 2020-04-07

## 2020-04-04 RX ORDER — DEXTROSE 50 % IN WATER 50 %
12.5 SYRINGE (ML) INTRAVENOUS ONCE
Refills: 0 | Status: DISCONTINUED | OUTPATIENT
Start: 2020-04-04 | End: 2020-04-14

## 2020-04-04 RX ORDER — METOPROLOL TARTRATE 50 MG
1 TABLET ORAL
Qty: 0 | Refills: 0 | DISCHARGE

## 2020-04-04 RX ORDER — HYDROXYCHLOROQUINE SULFATE 200 MG
TABLET ORAL
Refills: 0 | Status: DISCONTINUED | OUTPATIENT
Start: 2020-04-04 | End: 2020-04-08

## 2020-04-04 RX ORDER — LEVOTHYROXINE SODIUM 125 MCG
25 TABLET ORAL DAILY
Refills: 0 | Status: DISCONTINUED | OUTPATIENT
Start: 2020-04-04 | End: 2020-04-14

## 2020-04-04 RX ORDER — GLUCAGON INJECTION, SOLUTION 0.5 MG/.1ML
1 INJECTION, SOLUTION SUBCUTANEOUS ONCE
Refills: 0 | Status: DISCONTINUED | OUTPATIENT
Start: 2020-04-04 | End: 2020-04-14

## 2020-04-04 RX ORDER — FUROSEMIDE 40 MG
40 TABLET ORAL DAILY
Refills: 0 | Status: DISCONTINUED | OUTPATIENT
Start: 2020-04-04 | End: 2020-04-07

## 2020-04-04 RX ORDER — AMLODIPINE BESYLATE 2.5 MG/1
5 TABLET ORAL DAILY
Refills: 0 | Status: DISCONTINUED | OUTPATIENT
Start: 2020-04-04 | End: 2020-04-14

## 2020-04-04 RX ORDER — DEXTROSE 50 % IN WATER 50 %
15 SYRINGE (ML) INTRAVENOUS ONCE
Refills: 0 | Status: DISCONTINUED | OUTPATIENT
Start: 2020-04-04 | End: 2020-04-14

## 2020-04-04 RX ADMIN — ENOXAPARIN SODIUM 40 MILLIGRAM(S): 100 INJECTION SUBCUTANEOUS at 13:09

## 2020-04-04 RX ADMIN — Medication 25 MILLIGRAM(S): at 21:57

## 2020-04-04 RX ADMIN — Medication 25 MILLIGRAM(S): at 13:09

## 2020-04-04 RX ADMIN — Medication 40 MILLIGRAM(S): at 13:09

## 2020-04-04 RX ADMIN — ZINC SULFATE TAB 220 MG (50 MG ZINC EQUIVALENT) 220 MILLIGRAM(S): 220 (50 ZN) TAB at 13:08

## 2020-04-04 RX ADMIN — Medication 1: at 18:16

## 2020-04-04 RX ADMIN — Medication 400 MILLIGRAM(S): at 18:16

## 2020-04-04 RX ADMIN — AMLODIPINE BESYLATE 5 MILLIGRAM(S): 2.5 TABLET ORAL at 13:08

## 2020-04-04 RX ADMIN — Medication 1: at 13:30

## 2020-04-04 RX ADMIN — Medication 25 MICROGRAM(S): at 08:40

## 2020-04-04 RX ADMIN — LOSARTAN POTASSIUM 100 MILLIGRAM(S): 100 TABLET, FILM COATED ORAL at 13:08

## 2020-04-04 NOTE — PROGRESS NOTE ADULT - PROBLEM SELECTOR PLAN 1
Likely COVID 19 infection  - COVID 19 PCR pending  - O2 via NRB.  Titrate down as tolerated  - will check EKG and if QTC < 500 start HCQ  -also start steroids, follow fingersticks given DM history, may need additional insulin

## 2020-04-04 NOTE — PROGRESS NOTE ADULT - SUBJECTIVE AND OBJECTIVE BOX
Patient is a 74y old  Female who presents with a chief complaint of SOB, fever (03 Apr 2020 23:50)      INTERVAL HPI/OVERNIGHT EVENTS: Pt does not report any chest pain    MEDICATIONS  (STANDING):  amLODIPine   Tablet 5 milliGRAM(s) Oral daily  dextrose 50% Injectable 12.5 Gram(s) IV Push once  dextrose 50% Injectable 25 Gram(s) IV Push once  dextrose 50% Injectable 25 Gram(s) IV Push once  enoxaparin Injectable 40 milliGRAM(s) SubCutaneous daily  furosemide    Tablet 40 milliGRAM(s) Oral daily  hydrochlorothiazide 25 milliGRAM(s) Oral daily  insulin lispro (HumaLOG) corrective regimen sliding scale   SubCutaneous three times a day before meals  levothyroxine 25 MICROGram(s) Oral daily  losartan 100 milliGRAM(s) Oral daily  metoprolol succinate ER 25 milliGRAM(s) Oral daily  zinc sulfate 220 milliGRAM(s) Oral daily    MEDICATIONS  (PRN):  acetaminophen   Tablet .. 650 milliGRAM(s) Oral every 4 hours PRN Temp greater or equal to 38.5C (101.3F)  benzonatate 100 milliGRAM(s) Oral three times a day PRN Cough  dextrose 40% Gel 15 Gram(s) Oral once PRN Blood Glucose LESS THAN 70 milliGRAM(s)/deciliter  glucagon  Injectable 1 milliGRAM(s) IntraMuscular once PRN Glucose LESS THAN 70 milligrams/deciliter      Allergies    No Known Allergies    Intolerances        REVIEW OF SYSTEMS:  Please see interval HPI:    Vital Signs Last 24 Hrs  T(C): 36.9 (04 Apr 2020 09:22), Max: 37.2 (04 Apr 2020 04:11)  T(F): 98.5 (04 Apr 2020 09:22), Max: 99 (04 Apr 2020 04:11)  HR: 69 (04 Apr 2020 09:22) (60 - 88)  BP: 151/84 (04 Apr 2020 09:22) (120/64 - 169/78)  BP(mean): --  RR: 27 (04 Apr 2020 09:22) (24 - 30)  SpO2: 100% (04 Apr 2020 09:22) (61% - 100%)  I&O's Detail        PHYSICAL EXAM:  GENERAL: NAD  HEAD: NCAT    EYES: anicteric  NERVOUS SYSTEM:  AAOx3  CHEST/LUNG: no accessory muscle use, speaking in full sentences    SKIN:     LABS:                        13.1   7.47  )-----------( 209      ( 04 Apr 2020 06:00 )             36.5     04 Apr 2020 06:00    132    |  95     |  24     ----------------------------<  129    3.6     |  24     |  1.35     Ca    9.6        04 Apr 2020 06:00    TPro  7.7    /  Alb  3.5    /  TBili  0.6    /  DBili  x      /  AST  48     /  ALT  26     /  AlkPhos  51     04 Apr 2020 06:00    PT/INR - ( 03 Apr 2020 16:46 )   PT: 11.8 SEC;   INR: 1.03          PTT - ( 03 Apr 2020 16:46 )  PTT:31.2 SEC  CAPILLARY BLOOD GLUCOSE      POCT Blood Glucose.: 187 mg/dL (04 Apr 2020 13:16)  POCT Blood Glucose.: 129 mg/dL (04 Apr 2020 09:13)  POCT Blood Glucose.: 124 mg/dL (04 Apr 2020 05:43)    BLOOD CULTURE    RADIOLOGY & ADDITIONAL TESTS:    Imaging Personally Reviewed:  [ ] YES     Consultant(s) Notes Reviewed:      Care Discussed with Consultants/Other Providers:

## 2020-04-05 LAB
GLUCOSE BLDC GLUCOMTR-MCNC: 180 MG/DL — HIGH (ref 70–99)
GLUCOSE BLDC GLUCOMTR-MCNC: 194 MG/DL — HIGH (ref 70–99)
GLUCOSE BLDC GLUCOMTR-MCNC: 196 MG/DL — HIGH (ref 70–99)

## 2020-04-05 PROCEDURE — 99233 SBSQ HOSP IP/OBS HIGH 50: CPT

## 2020-04-05 RX ORDER — BENZOCAINE AND MENTHOL 5; 1 G/100ML; G/100ML
1 LIQUID ORAL ONCE
Refills: 0 | Status: COMPLETED | OUTPATIENT
Start: 2020-04-05 | End: 2020-04-05

## 2020-04-05 RX ADMIN — Medication 1: at 12:17

## 2020-04-05 RX ADMIN — ZINC SULFATE TAB 220 MG (50 MG ZINC EQUIVALENT) 220 MILLIGRAM(S): 220 (50 ZN) TAB at 12:17

## 2020-04-05 RX ADMIN — Medication 400 MILLIGRAM(S): at 06:55

## 2020-04-05 RX ADMIN — Medication 1: at 09:13

## 2020-04-05 RX ADMIN — LOSARTAN POTASSIUM 100 MILLIGRAM(S): 100 TABLET, FILM COATED ORAL at 04:41

## 2020-04-05 RX ADMIN — ENOXAPARIN SODIUM 40 MILLIGRAM(S): 100 INJECTION SUBCUTANEOUS at 12:16

## 2020-04-05 RX ADMIN — Medication 25 MILLIGRAM(S): at 04:41

## 2020-04-05 RX ADMIN — Medication 1: at 17:40

## 2020-04-05 RX ADMIN — AMLODIPINE BESYLATE 5 MILLIGRAM(S): 2.5 TABLET ORAL at 04:40

## 2020-04-05 RX ADMIN — Medication 40 MILLIGRAM(S): at 04:40

## 2020-04-05 RX ADMIN — BENZOCAINE AND MENTHOL 1 LOZENGE: 5; 1 LIQUID ORAL at 06:56

## 2020-04-05 RX ADMIN — Medication 25 MILLIGRAM(S): at 04:40

## 2020-04-05 RX ADMIN — Medication 40 MILLIGRAM(S): at 17:41

## 2020-04-05 RX ADMIN — Medication 25 MICROGRAM(S): at 04:35

## 2020-04-05 RX ADMIN — Medication 200 MILLIGRAM(S): at 17:40

## 2020-04-05 RX ADMIN — Medication 40 MILLIGRAM(S): at 06:55

## 2020-04-05 NOTE — PROGRESS NOTE ADULT - PROBLEM SELECTOR PLAN 1
- COVID 19 PCR positive   - O2 via NRB.  Titrate down as tolerated  - continue HCQ and steroids  - follow fingersticks given DM history, may need additional insulin

## 2020-04-05 NOTE — CHART NOTE - NSCHARTNOTEFT_GEN_A_CORE
Called by nurse because patient was complaining of throat swelling. Per nurse no swelling visualized when examined,  patient's oxygen saturation on NRB was 97%. She reports patient also able to speak full sentences, drink water and take medications. When nurse touched patient's throat, patient complained that throat was sore. Cepacol lozenge ordered. Will continue to monitor.

## 2020-04-05 NOTE — PROGRESS NOTE ADULT - SUBJECTIVE AND OBJECTIVE BOX
Patient is a 74y old  Female who presents with a chief complaint of SOB, fever (04 Apr 2020 13:49)      INTERVAL HPI/OVERNIGHT EVENTS: no acute overnight events, wearing NRB, sitting on bed    MEDICATIONS  (STANDING):  amLODIPine   Tablet 5 milliGRAM(s) Oral daily  dextrose 50% Injectable 12.5 Gram(s) IV Push once  dextrose 50% Injectable 25 Gram(s) IV Push once  dextrose 50% Injectable 25 Gram(s) IV Push once  enoxaparin Injectable 40 milliGRAM(s) SubCutaneous daily  furosemide    Tablet 40 milliGRAM(s) Oral daily  hydrochlorothiazide 25 milliGRAM(s) Oral daily  hydroxychloroquine   Oral   hydroxychloroquine 200 milliGRAM(s) Oral every 12 hours  insulin lispro (HumaLOG) corrective regimen sliding scale   SubCutaneous three times a day before meals  levothyroxine 25 MICROGram(s) Oral daily  losartan 100 milliGRAM(s) Oral daily  methylPREDNISolone sodium succinate Injectable 40 milliGRAM(s) IV Push two times a day  metoprolol succinate ER 25 milliGRAM(s) Oral daily  zinc sulfate 220 milliGRAM(s) Oral daily    MEDICATIONS  (PRN):  acetaminophen   Tablet .. 650 milliGRAM(s) Oral every 4 hours PRN Temp greater or equal to 38.5C (101.3F)  benzonatate 100 milliGRAM(s) Oral three times a day PRN Cough  dextrose 40% Gel 15 Gram(s) Oral once PRN Blood Glucose LESS THAN 70 milliGRAM(s)/deciliter  glucagon  Injectable 1 milliGRAM(s) IntraMuscular once PRN Glucose LESS THAN 70 milligrams/deciliter      Allergies    No Known Allergies    Intolerances        REVIEW OF SYSTEMS:  Please see interval HPI:    Vital Signs Last 24 Hrs  T(C): 36.4 (05 Apr 2020 12:25), Max: 36.4 (05 Apr 2020 12:25)  T(F): 97.5 (05 Apr 2020 12:25), Max: 97.5 (05 Apr 2020 12:25)  HR: 107 (05 Apr 2020 12:25) (64 - 107)  BP: 100/38 (05 Apr 2020 12:25) (100/38 - 146/64)  BP(mean): --  RR: 22 (05 Apr 2020 12:25) (22 - 28)  SpO2: 99% (05 Apr 2020 12:25) (94% - 99%)  I&O's Detail    04 Apr 2020 07:01  -  05 Apr 2020 07:00  --------------------------------------------------------  IN:  Total IN: 0 mL    OUT:    Voided: 400 mL  Total OUT: 400 mL    Total NET: -400 mL            PHYSICAL EXAM:  GENERAL: NAD  HEAD:  NCAT  EYES: anictieric  NERVOUS SYSTEM:  follows commands  CHEST/LUNG: no accessory muscle use, speaking in full sentences  ABDOMEN: soft NTND  EXTREMITIES:  no CCE  SKIN: no rashes    LABS:      Ca    9.6        04 Apr 2020 06:00        CAPILLARY BLOOD GLUCOSE      POCT Blood Glucose.: 196 mg/dL (05 Apr 2020 17:16)  POCT Blood Glucose.: 194 mg/dL (05 Apr 2020 11:56)  POCT Blood Glucose.: 180 mg/dL (05 Apr 2020 08:47)  POCT Blood Glucose.: 167 mg/dL (04 Apr 2020 21:19)    BLOOD CULTURE  04-03 @ 20:15   No growth to date.  --  --    RADIOLOGY & ADDITIONAL TESTS:    Imaging Personally Reviewed:  [ ] YES     Consultant(s) Notes Reviewed:      Care Discussed with Consultants/Other Providers:

## 2020-04-06 LAB
ALBUMIN SERPL ELPH-MCNC: 3.5 G/DL — SIGNIFICANT CHANGE UP (ref 3.3–5)
ALP SERPL-CCNC: 59 U/L — SIGNIFICANT CHANGE UP (ref 40–120)
ALT FLD-CCNC: 22 U/L — SIGNIFICANT CHANGE UP (ref 4–33)
ANION GAP SERPL CALC-SCNC: 17 MMO/L — HIGH (ref 7–14)
AST SERPL-CCNC: 29 U/L — SIGNIFICANT CHANGE UP (ref 4–32)
BILIRUB SERPL-MCNC: 0.7 MG/DL — SIGNIFICANT CHANGE UP (ref 0.2–1.2)
BUN SERPL-MCNC: 34 MG/DL — HIGH (ref 7–23)
CALCIUM SERPL-MCNC: 10 MG/DL — SIGNIFICANT CHANGE UP (ref 8.4–10.5)
CHLORIDE SERPL-SCNC: 94 MMOL/L — LOW (ref 98–107)
CO2 SERPL-SCNC: 23 MMOL/L — SIGNIFICANT CHANGE UP (ref 22–31)
CREAT SERPL-MCNC: 1.46 MG/DL — HIGH (ref 0.5–1.3)
GLUCOSE BLDC GLUCOMTR-MCNC: 185 MG/DL — HIGH (ref 70–99)
GLUCOSE BLDC GLUCOMTR-MCNC: 222 MG/DL — HIGH (ref 70–99)
GLUCOSE BLDC GLUCOMTR-MCNC: 231 MG/DL — HIGH (ref 70–99)
GLUCOSE BLDC GLUCOMTR-MCNC: 236 MG/DL — HIGH (ref 70–99)
GLUCOSE SERPL-MCNC: 171 MG/DL — HIGH (ref 70–99)
MAGNESIUM SERPL-MCNC: 1.5 MG/DL — LOW (ref 1.6–2.6)
PHOSPHATE SERPL-MCNC: 4.2 MG/DL — SIGNIFICANT CHANGE UP (ref 2.5–4.5)
POTASSIUM SERPL-MCNC: 3.6 MMOL/L — SIGNIFICANT CHANGE UP (ref 3.5–5.3)
POTASSIUM SERPL-SCNC: 3.6 MMOL/L — SIGNIFICANT CHANGE UP (ref 3.5–5.3)
PROT SERPL-MCNC: 7.5 G/DL — SIGNIFICANT CHANGE UP (ref 6–8.3)
SODIUM SERPL-SCNC: 134 MMOL/L — LOW (ref 135–145)

## 2020-04-06 RX ORDER — HYDROXYZINE HCL 10 MG
25 TABLET ORAL AT BEDTIME
Refills: 0 | Status: DISCONTINUED | OUTPATIENT
Start: 2020-04-06 | End: 2020-04-14

## 2020-04-06 RX ORDER — MAGNESIUM SULFATE 500 MG/ML
2 VIAL (ML) INJECTION ONCE
Refills: 0 | Status: COMPLETED | OUTPATIENT
Start: 2020-04-06 | End: 2020-04-06

## 2020-04-06 RX ADMIN — Medication 25 MICROGRAM(S): at 06:41

## 2020-04-06 RX ADMIN — Medication 40 MILLIGRAM(S): at 06:41

## 2020-04-06 RX ADMIN — AMLODIPINE BESYLATE 5 MILLIGRAM(S): 2.5 TABLET ORAL at 06:42

## 2020-04-06 RX ADMIN — Medication 650 MILLIGRAM(S): at 19:40

## 2020-04-06 RX ADMIN — ENOXAPARIN SODIUM 40 MILLIGRAM(S): 100 INJECTION SUBCUTANEOUS at 12:31

## 2020-04-06 RX ADMIN — Medication 25 MILLIGRAM(S): at 06:42

## 2020-04-06 RX ADMIN — Medication 200 MILLIGRAM(S): at 18:27

## 2020-04-06 RX ADMIN — Medication 40 MILLIGRAM(S): at 18:27

## 2020-04-06 RX ADMIN — Medication 2: at 17:54

## 2020-04-06 RX ADMIN — Medication 200 MILLIGRAM(S): at 06:49

## 2020-04-06 RX ADMIN — Medication 20 MILLIGRAM(S): at 22:40

## 2020-04-06 RX ADMIN — LOSARTAN POTASSIUM 100 MILLIGRAM(S): 100 TABLET, FILM COATED ORAL at 06:42

## 2020-04-06 RX ADMIN — Medication 2: at 12:36

## 2020-04-06 RX ADMIN — Medication 50 GRAM(S): at 12:33

## 2020-04-06 NOTE — PROGRESS NOTE ADULT - SUBJECTIVE AND OBJECTIVE BOX
Subjective/Interval Events: Feeling well on NRB, denies SOB while on O2. Has SOB when moving around.     Hospital Medications:  acetaminophen   Tablet .. 650 milliGRAM(s) Oral every 4 hours PRN  amLODIPine   Tablet 5 milliGRAM(s) Oral daily  benzonatate 100 milliGRAM(s) Oral three times a day PRN  dextrose 40% Gel 15 Gram(s) Oral once PRN  dextrose 50% Injectable 12.5 Gram(s) IV Push once  dextrose 50% Injectable 25 Gram(s) IV Push once  dextrose 50% Injectable 25 Gram(s) IV Push once  enoxaparin Injectable 40 milliGRAM(s) SubCutaneous daily  furosemide    Tablet 40 milliGRAM(s) Oral daily  glucagon  Injectable 1 milliGRAM(s) IntraMuscular once PRN  hydrochlorothiazide 25 milliGRAM(s) Oral daily  hydroxychloroquine   Oral   hydroxychloroquine 200 milliGRAM(s) Oral every 12 hours  insulin lispro (HumaLOG) corrective regimen sliding scale   SubCutaneous three times a day before meals  levothyroxine 25 MICROGram(s) Oral daily  losartan 100 milliGRAM(s) Oral daily  methylPREDNISolone sodium succinate Injectable 40 milliGRAM(s) IV Push two times a day  metoprolol succinate ER 25 milliGRAM(s) Oral daily  zinc sulfate 220 milliGRAM(s) Oral daily    Physical Exam:  T(C): 36.3 (04-06-20 @ 12:30), Max: 36.5 (04-05-20 @ 23:30)  HR: 68 (04-06-20 @ 12:30) (68 - 79)  BP: 144/79 (04-06-20 @ 12:30) (117/64 - 144/79)  RR: 24 (04-06-20 @ 12:30) (24 - 24)  SpO2: 98% (04-06-20 @ 12:30) (93% - 98%)    Comfortable appearing  On NRB  No respiratory distress    Labs: reviewed    04-06    134<L>  |  94<L>  |  34<H>  ----------------------------<  171<H>  3.6   |  23  |  1.46<H>    Ca    10.0      06 Apr 2020 05:20  Phos  4.2     04-06  Mg     1.5     04-06    TPro  7.5  /  Alb  3.5  /  TBili  0.7  /  DBili  x   /  AST  29  /  ALT  22  /  AlkPhos  59  04-06    LIVER FUNCTIONS - ( 06 Apr 2020 05:20 )  Alb: 3.5 g/dL / Pro: 7.5 g/dL / ALK PHOS: 59 u/L / ALT: 22 u/L / AST: 29 u/L / GGT: x             Culture - Blood (collected 03 Apr 2020 20:15)  Source: .Blood Blood-Venous  Preliminary Report (04 Apr 2020 21:02):    No growth to date.    Culture - Blood (collected 03 Apr 2020 20:15)  Source: .Blood Blood-Peripheral  Preliminary Report (04 Apr 2020 21:02):    No growth to date.        Diagnostic Studies: reviewed

## 2020-04-06 NOTE — PROGRESS NOTE ADULT - ASSESSMENT
74F w/ DMII, hypothyroidism, CVA, p/w fevers, chills, cough, dyspnea, found to be COVID-19 positive.    COVID-19 Test: + 4/3  Oxygen Support: NRB  Hydroxychloroquine: starting 4/5  Steroids: starting 4/5  Anakinra: not yet started    Problem/Plan:  #Hypoxic respiratory failure 2/2 COVID-19 infection  - supplemental O2 via NRB as needed  - Tylenol PRN  - Albuterol PRN  - Will discuss utility of anakinra w/ pulmonology    #EVELIA - likely 2/2 viral infection  - trend Cr, IVF PRN    #Chronic Medical Problems:  - DM: ISS, f/u FS  - HTN: c/w home meds  - Hypothyroidism: c/w synthroid    #FEN/PPx  - heparin SubQ  - regular diet    #FULL CODE at this time, will continue to discuss    Ibrahima Strickland  Acting Medicine Hospitalist  (c) [phone]  (p) [phone] 74F w/ DMII, hypothyroidism, CVA, p/w fevers, chills, cough, dyspnea, found to be COVID-19 positive.    COVID-19 Test: + 4/3  Oxygen Support: NRB  Hydroxychloroquine: starting 4/5  Steroids: starting 4/5  Anakinra: not yet started    Problem/Plan:  #Hypoxic respiratory failure 2/2 COVID-19 infection  - supplemental O2 via NRB as needed  - Tylenol PRN  - Albuterol PRN  - Will discuss utility of anakinra w/ pulmonology    #EVELIA - likely 2/2 viral infection  - trend Cr, IVF PRN    #Chronic Medical Problems:  - DM: ISS, f/u FS  - HTN: c/w home meds  - Hypothyroidism: c/w synthroid  - Insomnia - c/w paroxetine, hydroxyzine PRN    #FEN/PPx  - heparin SubQ  - regular diet    #GOC - per discussion w/ patient and family, pt would likely not want to be intubated if hypoxia progressed    Ibrahima Strickland  Acting Medicine Hospitalist  (c) [phone]  (p) [phone] 74F w/ DMII, hypothyroidism, CVA, p/w fevers, chills, cough, dyspnea, found to be COVID-19 positive.    COVID-19 Test: + 4/3  Oxygen Support: NRB  Hydroxychloroquine: starting 4/5  Steroids: starting 4/5  Anakinra: not yet started    Problem/Plan:  #Hypoxic respiratory failure 2/2 COVID-19 infection  - supplemental O2 via NRB as needed  - Tylenol PRN  - Albuterol PRN  - If no improvement in oxygenation by 4/7 will start anakinra    #EVELIA - likely 2/2 viral infection  - trend Cr, IVF PRN    #Chronic Medical Problems:  - DM: ISS, f/u FS  - HTN: c/w home meds  - Hypothyroidism: c/w synthroid  - Insomnia - c/w paroxetine, hydroxyzine PRN    #FEN/PPx  - heparin SubQ  - regular diet    #GOC - per discussion w/ patient and family, pt would likely not want to be intubated if hypoxia progressed    Ibrahima Strickland  Acting Medicine Hospitalist  (c) [phone]  (p) [phone] 74F w/ DMII, hypothyroidism, CVA, p/w fevers, chills, cough, dyspnea, found to be COVID-19 positive.    COVID-19 Test: + 4/3  Oxygen Support: NRB  Hydroxychloroquine: starting 4/5  Steroids: starting 4/5  Anakinra: not yet started    Problem/Plan:  #Hypoxic respiratory failure 2/2 COVID-19 infection  - supplemental O2 via NRB as needed  - Tylenol PRN  - Albuterol PRN  - If no improvement in oxygenation by 4/7 will start anakinra    #EVELIA - likely 2/2 viral infection  - trend Cr, IVF PRN    #Chronic Medical Problems:  - DM: ISS, f/u FS  - HTN: c/w home meds  - Hypothyroidism: c/w synthroid  - Insomnia - c/w paroxetine, hydroxyzine PRN    #FEN/PPx  - heparin SubQ  - regular diet    #GOC - per discussion w/ patient and family, pt would likely not want to be intubated if hypoxia progressed, but would want further discussion if pt decompensates    Ibrahima Strickland  Acting Medicine Hospitalist  (c) [phone]  (p) [phone] 74F w/ DMII, hypothyroidism, CVA, p/w fevers, chills, cough, dyspnea, found to be COVID-19 positive.    COVID-19 Test: + 4/3  Oxygen Support: NRB  QTC: 471  Hydroxychloroquine: starting 4/5  Steroids: starting 4/5  Anakinra: not yet started    Problem/Plan:  #Hypoxic respiratory failure 2/2 COVID-19 infection  - supplemental O2 via NRB as needed  - Tylenol PRN  - Albuterol PRN  - If no improvement in oxygenation by 4/7 will start anakinra    #EVELIA - likely 2/2 viral infection  - trend Cr, IVF PRN    #Chronic Medical Problems:  - DM: ISS, f/u FS  - HTN: c/w home meds  - Hypothyroidism: c/w synthroid  - Insomnia - c/w paroxetine, hydroxyzine PRN    #FEN/PPx  - heparin SubQ  - regular diet    #GOC - per discussion w/ patient and family, pt would likely not want to be intubated if hypoxia progressed, but would want further discussion if pt decompensates    Ibrahima Strickland  Acting Medicine Hospitalist  (c) [phone]  (p) [phone]

## 2020-04-07 LAB
ALBUMIN SERPL ELPH-MCNC: 3.6 G/DL — SIGNIFICANT CHANGE UP (ref 3.3–5)
ALP SERPL-CCNC: 59 U/L — SIGNIFICANT CHANGE UP (ref 40–120)
ALT FLD-CCNC: 24 U/L — SIGNIFICANT CHANGE UP (ref 4–33)
ANION GAP SERPL CALC-SCNC: 17 MMO/L — HIGH (ref 7–14)
AST SERPL-CCNC: 27 U/L — SIGNIFICANT CHANGE UP (ref 4–32)
BASOPHILS # BLD AUTO: 0.01 K/UL — SIGNIFICANT CHANGE UP (ref 0–0.2)
BASOPHILS NFR BLD AUTO: 0.1 % — SIGNIFICANT CHANGE UP (ref 0–2)
BILIRUB SERPL-MCNC: 0.9 MG/DL — SIGNIFICANT CHANGE UP (ref 0.2–1.2)
BUN SERPL-MCNC: 55 MG/DL — HIGH (ref 7–23)
CALCIUM SERPL-MCNC: 10.7 MG/DL — HIGH (ref 8.4–10.5)
CHLORIDE SERPL-SCNC: 96 MMOL/L — LOW (ref 98–107)
CO2 SERPL-SCNC: 24 MMOL/L — SIGNIFICANT CHANGE UP (ref 22–31)
CREAT SERPL-MCNC: 1.83 MG/DL — HIGH (ref 0.5–1.3)
CULTURE RESULTS: SIGNIFICANT CHANGE UP
CULTURE RESULTS: SIGNIFICANT CHANGE UP
EOSINOPHIL # BLD AUTO: 0 K/UL — SIGNIFICANT CHANGE UP (ref 0–0.5)
EOSINOPHIL NFR BLD AUTO: 0 % — SIGNIFICANT CHANGE UP (ref 0–6)
GLUCOSE BLDC GLUCOMTR-MCNC: 193 MG/DL — HIGH (ref 70–99)
GLUCOSE BLDC GLUCOMTR-MCNC: 194 MG/DL — HIGH (ref 70–99)
GLUCOSE BLDC GLUCOMTR-MCNC: 200 MG/DL — HIGH (ref 70–99)
GLUCOSE BLDC GLUCOMTR-MCNC: 216 MG/DL — HIGH (ref 70–99)
GLUCOSE BLDC GLUCOMTR-MCNC: 262 MG/DL — HIGH (ref 70–99)
GLUCOSE SERPL-MCNC: 194 MG/DL — HIGH (ref 70–99)
HCT VFR BLD CALC: 36.4 % — SIGNIFICANT CHANGE UP (ref 34.5–45)
HGB BLD-MCNC: 13.2 G/DL — SIGNIFICANT CHANGE UP (ref 11.5–15.5)
IMM GRANULOCYTES NFR BLD AUTO: 0.9 % — SIGNIFICANT CHANGE UP (ref 0–1.5)
LYMPHOCYTES # BLD AUTO: 0.67 K/UL — LOW (ref 1–3.3)
LYMPHOCYTES # BLD AUTO: 6.2 % — LOW (ref 13–44)
MAGNESIUM SERPL-MCNC: 1.8 MG/DL — SIGNIFICANT CHANGE UP (ref 1.6–2.6)
MCHC RBC-ENTMCNC: 33 PG — SIGNIFICANT CHANGE UP (ref 27–34)
MCHC RBC-ENTMCNC: 36.3 % — HIGH (ref 32–36)
MCV RBC AUTO: 91 FL — SIGNIFICANT CHANGE UP (ref 80–100)
MONOCYTES # BLD AUTO: 0.94 K/UL — HIGH (ref 0–0.9)
MONOCYTES NFR BLD AUTO: 8.6 % — SIGNIFICANT CHANGE UP (ref 2–14)
NEUTROPHILS # BLD AUTO: 9.15 K/UL — HIGH (ref 1.8–7.4)
NEUTROPHILS NFR BLD AUTO: 84.2 % — HIGH (ref 43–77)
NRBC # FLD: 0 K/UL — SIGNIFICANT CHANGE UP (ref 0–0)
PHOSPHATE SERPL-MCNC: 4.2 MG/DL — SIGNIFICANT CHANGE UP (ref 2.5–4.5)
PLATELET # BLD AUTO: 427 K/UL — HIGH (ref 150–400)
PMV BLD: 9.6 FL — SIGNIFICANT CHANGE UP (ref 7–13)
POTASSIUM SERPL-MCNC: 3.7 MMOL/L — SIGNIFICANT CHANGE UP (ref 3.5–5.3)
POTASSIUM SERPL-SCNC: 3.7 MMOL/L — SIGNIFICANT CHANGE UP (ref 3.5–5.3)
PROT SERPL-MCNC: 7.9 G/DL — SIGNIFICANT CHANGE UP (ref 6–8.3)
RBC # BLD: 4 M/UL — SIGNIFICANT CHANGE UP (ref 3.8–5.2)
RBC # FLD: 12.4 % — SIGNIFICANT CHANGE UP (ref 10.3–14.5)
SODIUM SERPL-SCNC: 137 MMOL/L — SIGNIFICANT CHANGE UP (ref 135–145)
SPECIMEN SOURCE: SIGNIFICANT CHANGE UP
SPECIMEN SOURCE: SIGNIFICANT CHANGE UP
WBC # BLD: 10.87 K/UL — HIGH (ref 3.8–10.5)
WBC # FLD AUTO: 10.87 K/UL — HIGH (ref 3.8–10.5)

## 2020-04-07 PROCEDURE — 93010 ELECTROCARDIOGRAM REPORT: CPT

## 2020-04-07 RX ADMIN — AMLODIPINE BESYLATE 5 MILLIGRAM(S): 2.5 TABLET ORAL at 05:47

## 2020-04-07 RX ADMIN — Medication 25 MILLIGRAM(S): at 05:47

## 2020-04-07 RX ADMIN — Medication 1: at 10:11

## 2020-04-07 RX ADMIN — LOSARTAN POTASSIUM 100 MILLIGRAM(S): 100 TABLET, FILM COATED ORAL at 05:47

## 2020-04-07 RX ADMIN — ENOXAPARIN SODIUM 40 MILLIGRAM(S): 100 INJECTION SUBCUTANEOUS at 16:56

## 2020-04-07 RX ADMIN — Medication 200 MILLIGRAM(S): at 16:57

## 2020-04-07 RX ADMIN — Medication 2: at 16:56

## 2020-04-07 RX ADMIN — Medication 40 MILLIGRAM(S): at 16:57

## 2020-04-07 RX ADMIN — Medication 20 MILLIGRAM(S): at 21:13

## 2020-04-07 RX ADMIN — Medication 40 MILLIGRAM(S): at 05:47

## 2020-04-07 RX ADMIN — Medication 25 MICROGRAM(S): at 05:47

## 2020-04-07 RX ADMIN — Medication 1: at 13:38

## 2020-04-07 NOTE — PROGRESS NOTE ADULT - ASSESSMENT
74F w/ DMII, hypothyroidism, CVA, p/w fevers, chills, cough, dyspnea, found to be COVID-19 positive.    COVID-19 Test: +4/3  Oxygen Support: NRB  Hydroxychloroquine: starting 4/5  Steroids: starting 4/5  Anakinra: considering starting 4/8    Problem/Plan:  #Hypoxic respiratory failure 2/2 COVID-19 infection  - supplemental O2 via NRB as needed  - Tylenol PRN  - Albuterol PRN  - awaiting inflammatory markers 4/8, if elevated will start anakinra (per d/w Dr. Ríos of [place])    #EVELIA - likely 2/2 viral infection  - trend Cr, IVF PRN    #Chronic Medical Problems:  - DM: ISS, f/u FS  - HTN: c/w home meds  - Hypothyroidism: c/w synthroid  - Insomnia - c/w paroxetine, hydroxyzine PRN    #FEN/PPx  - heparin SubQ  - regular diet    #GOC – pt would not want to be intubated in case of clinical deterioration; discussed w/ family as well    Ibrahima Strickland  Acting Medicine Hospitalist  (c) [phone]  (p) [phone] 74F w/ DMII, hypothyroidism, CVA, p/w fevers, chills, cough, dyspnea, found to be COVID-19 positive.    COVID-19 Test: +4/3  Oxygen Support: NRB  Hydroxychloroquine: starting 4/5  Steroids: starting 4/5  Anakinra: considering starting 4/8    Problem/Plan:  #Hypoxic respiratory failure 2/2 COVID-19 infection  - supplemental O2 via NRB as needed  - Tylenol PRN  - Albuterol PRN  - awaiting inflammatory markers 4/8, if elevated will start anakinra (per d/w Dr. Ríos of [place])    #EVELIA - likely 2/2 viral infection  - trend Cr, IVF PRN  - hold lasix/losartan    #Chronic Medical Problems:  - DM: ISS, f/u FS  - HTN: holding losartan and lasix in setting of EVELIA, c/w amlodipine  - Hypothyroidism: c/w synthroid  - Insomnia - c/w paroxetine, hydroxyzine PRN    #FEN/PPx  - heparin SubQ  - regular diet    #GOC – pt would not want to be intubated in case of clinical deterioration; discussed w/ family as well    Ibrahima Strickland  Acting Medicine Hospitalist  (c) [phone]  (p) [phone]

## 2020-04-07 NOTE — PROGRESS NOTE ADULT - SUBJECTIVE AND OBJECTIVE BOX
Subjective/Interval Events: Pt feeling better today, reports decreased SOB. Remains on NRB.     Hospital Medications:  acetaminophen   Tablet .. 650 milliGRAM(s) Oral every 4 hours PRN  amLODIPine   Tablet 5 milliGRAM(s) Oral daily  benzonatate 100 milliGRAM(s) Oral three times a day PRN  dextrose 40% Gel 15 Gram(s) Oral once PRN  dextrose 50% Injectable 12.5 Gram(s) IV Push once  dextrose 50% Injectable 25 Gram(s) IV Push once  dextrose 50% Injectable 25 Gram(s) IV Push once  enoxaparin Injectable 40 milliGRAM(s) SubCutaneous daily  furosemide    Tablet 40 milliGRAM(s) Oral daily  glucagon  Injectable 1 milliGRAM(s) IntraMuscular once PRN  hydrochlorothiazide 25 milliGRAM(s) Oral daily  hydroxychloroquine   Oral   hydroxychloroquine 200 milliGRAM(s) Oral every 12 hours  hydrOXYzine hydrochloride 25 milliGRAM(s) Oral at bedtime PRN  insulin lispro (HumaLOG) corrective regimen sliding scale   SubCutaneous three times a day before meals  levothyroxine 25 MICROGram(s) Oral daily  losartan 100 milliGRAM(s) Oral daily  methylPREDNISolone sodium succinate Injectable 40 milliGRAM(s) IV Push two times a day  metoprolol succinate ER 25 milliGRAM(s) Oral daily  PARoxetine 20 milliGRAM(s) Oral at bedtime  zinc sulfate 220 milliGRAM(s) Oral daily      Physical Exam:  T(C): 36.3 (04-07-20 @ 13:40), Max: 36.6 (04-07-20 @ 05:45)  HR: 102 (04-07-20 @ 13:40) (90 - 102)  BP: 103/81 (04-07-20 @ 13:40) (103/81 - 136/68)  RR: 20 (04-07-20 @ 13:40) (20 - 20)  SpO2: 95% (04-07-20 @ 13:40) (95% - 98%)    Comfortable appearing  On NRB  No respiratory distress    Labs: reviewed                        13.2   10.87 )-----------( 427      ( 07 Apr 2020 11:30 )             36.4     04-07    137  |  96<L>  |  55<H>  ----------------------------<  194<H>  3.7   |  24  |  1.83<H>    Ca    10.7<H>      07 Apr 2020 11:30  Phos  4.2     04-07  Mg     1.8     04-07    TPro  7.9  /  Alb  3.6  /  TBili  0.9  /  DBili  x   /  AST  27  /  ALT  24  /  AlkPhos  59  04-07    LIVER FUNCTIONS - ( 07 Apr 2020 11:30 )  Alb: 3.6 g/dL / Pro: 7.9 g/dL / ALK PHOS: 59 u/L / ALT: 24 u/L / AST: 27 u/L / GGT: x               Diagnostic Studies: reviewed

## 2020-04-08 LAB
ALBUMIN SERPL ELPH-MCNC: 3.6 G/DL — SIGNIFICANT CHANGE UP (ref 3.3–5)
ALP SERPL-CCNC: 61 U/L — SIGNIFICANT CHANGE UP (ref 40–120)
ALT FLD-CCNC: 30 U/L — SIGNIFICANT CHANGE UP (ref 4–33)
ANION GAP SERPL CALC-SCNC: 17 MMO/L — HIGH (ref 7–14)
AST SERPL-CCNC: 32 U/L — SIGNIFICANT CHANGE UP (ref 4–32)
BASOPHILS # BLD AUTO: 0.01 K/UL — SIGNIFICANT CHANGE UP (ref 0–0.2)
BASOPHILS NFR BLD AUTO: 0.1 % — SIGNIFICANT CHANGE UP (ref 0–2)
BILIRUB SERPL-MCNC: 0.8 MG/DL — SIGNIFICANT CHANGE UP (ref 0.2–1.2)
BUN SERPL-MCNC: 59 MG/DL — HIGH (ref 7–23)
CALCIUM SERPL-MCNC: 10.3 MG/DL — SIGNIFICANT CHANGE UP (ref 8.4–10.5)
CHLORIDE SERPL-SCNC: 96 MMOL/L — LOW (ref 98–107)
CO2 SERPL-SCNC: 23 MMOL/L — SIGNIFICANT CHANGE UP (ref 22–31)
CREAT SERPL-MCNC: 1.79 MG/DL — HIGH (ref 0.5–1.3)
CRP SERPL-MCNC: 24.7 MG/L — HIGH
EOSINOPHIL # BLD AUTO: 0 K/UL — SIGNIFICANT CHANGE UP (ref 0–0.5)
EOSINOPHIL NFR BLD AUTO: 0 % — SIGNIFICANT CHANGE UP (ref 0–6)
FERRITIN SERPL-MCNC: 830.6 NG/ML — HIGH (ref 15–150)
GLUCOSE BLDC GLUCOMTR-MCNC: 210 MG/DL — HIGH (ref 70–99)
GLUCOSE BLDC GLUCOMTR-MCNC: 254 MG/DL — HIGH (ref 70–99)
GLUCOSE BLDC GLUCOMTR-MCNC: 258 MG/DL — HIGH (ref 70–99)
GLUCOSE BLDC GLUCOMTR-MCNC: 267 MG/DL — HIGH (ref 70–99)
GLUCOSE SERPL-MCNC: 206 MG/DL — HIGH (ref 70–99)
HCT VFR BLD CALC: 37.3 % — SIGNIFICANT CHANGE UP (ref 34.5–45)
HGB BLD-MCNC: 13.2 G/DL — SIGNIFICANT CHANGE UP (ref 11.5–15.5)
IMM GRANULOCYTES NFR BLD AUTO: 1 % — SIGNIFICANT CHANGE UP (ref 0–1.5)
LYMPHOCYTES # BLD AUTO: 0.53 K/UL — LOW (ref 1–3.3)
LYMPHOCYTES # BLD AUTO: 6.5 % — LOW (ref 13–44)
MAGNESIUM SERPL-MCNC: 2 MG/DL — SIGNIFICANT CHANGE UP (ref 1.6–2.6)
MCHC RBC-ENTMCNC: 32.1 PG — SIGNIFICANT CHANGE UP (ref 27–34)
MCHC RBC-ENTMCNC: 35.4 % — SIGNIFICANT CHANGE UP (ref 32–36)
MCV RBC AUTO: 90.8 FL — SIGNIFICANT CHANGE UP (ref 80–100)
MONOCYTES # BLD AUTO: 0.55 K/UL — SIGNIFICANT CHANGE UP (ref 0–0.9)
MONOCYTES NFR BLD AUTO: 6.7 % — SIGNIFICANT CHANGE UP (ref 2–14)
NEUTROPHILS # BLD AUTO: 7.01 K/UL — SIGNIFICANT CHANGE UP (ref 1.8–7.4)
NEUTROPHILS NFR BLD AUTO: 85.7 % — HIGH (ref 43–77)
NRBC # FLD: 0 K/UL — SIGNIFICANT CHANGE UP (ref 0–0)
PLATELET # BLD AUTO: 426 K/UL — HIGH (ref 150–400)
PMV BLD: 9.5 FL — SIGNIFICANT CHANGE UP (ref 7–13)
POTASSIUM SERPL-MCNC: 3.5 MMOL/L — SIGNIFICANT CHANGE UP (ref 3.5–5.3)
POTASSIUM SERPL-SCNC: 3.5 MMOL/L — SIGNIFICANT CHANGE UP (ref 3.5–5.3)
PROCALCITONIN SERPL-MCNC: 0.13 NG/ML — HIGH (ref 0.02–0.1)
PROT SERPL-MCNC: 8 G/DL — SIGNIFICANT CHANGE UP (ref 6–8.3)
RBC # BLD: 4.11 M/UL — SIGNIFICANT CHANGE UP (ref 3.8–5.2)
RBC # FLD: 12 % — SIGNIFICANT CHANGE UP (ref 10.3–14.5)
SODIUM SERPL-SCNC: 136 MMOL/L — SIGNIFICANT CHANGE UP (ref 135–145)
WBC # BLD: 8.18 K/UL — SIGNIFICANT CHANGE UP (ref 3.8–10.5)
WBC # FLD AUTO: 8.18 K/UL — SIGNIFICANT CHANGE UP (ref 3.8–10.5)

## 2020-04-08 PROCEDURE — 93010 ELECTROCARDIOGRAM REPORT: CPT

## 2020-04-08 PROCEDURE — 99232 SBSQ HOSP IP/OBS MODERATE 35: CPT

## 2020-04-08 RX ORDER — ACETAMINOPHEN 500 MG
650 TABLET ORAL ONCE
Refills: 0 | Status: DISCONTINUED | OUTPATIENT
Start: 2020-04-08 | End: 2020-04-14

## 2020-04-08 RX ADMIN — ZINC SULFATE TAB 220 MG (50 MG ZINC EQUIVALENT) 220 MILLIGRAM(S): 220 (50 ZN) TAB at 12:08

## 2020-04-08 RX ADMIN — AMLODIPINE BESYLATE 5 MILLIGRAM(S): 2.5 TABLET ORAL at 05:27

## 2020-04-08 RX ADMIN — Medication 200 MILLIGRAM(S): at 05:27

## 2020-04-08 RX ADMIN — Medication 40 MILLIGRAM(S): at 17:08

## 2020-04-08 RX ADMIN — Medication 20 MILLIGRAM(S): at 22:23

## 2020-04-08 RX ADMIN — Medication 3: at 14:17

## 2020-04-08 RX ADMIN — Medication 25 MILLIGRAM(S): at 05:27

## 2020-04-08 RX ADMIN — ENOXAPARIN SODIUM 40 MILLIGRAM(S): 100 INJECTION SUBCUTANEOUS at 12:07

## 2020-04-08 RX ADMIN — Medication 2: at 09:19

## 2020-04-08 RX ADMIN — Medication 3: at 17:12

## 2020-04-08 RX ADMIN — Medication 40 MILLIGRAM(S): at 05:27

## 2020-04-08 RX ADMIN — Medication 25 MICROGRAM(S): at 05:27

## 2020-04-08 NOTE — PROGRESS NOTE ADULT - SUBJECTIVE AND OBJECTIVE BOX
Progress Note:   · Provider Specialty	Internal Medicine	    Reason for Admission:    Reason for Admission:  · Reason for Admission	SOB, fever	      · Subjective and Objective: 	  Subjective/Interval Events: Pt feeling better today, reports decreased SOB. Remains on NRB.     On NRB 02 high 90’s    Translation phone used    Comfortable    Supine    Eating    Plaquenil stopped today due to mild prolonged QT, had started 4/4    Solumedrol started 4/4    Hospital Medications:  acetaminophen   Tablet .. 650 milliGRAM(s) Oral every 4 hours PRN  amLODIPine   Tablet 5 milliGRAM(s) Oral daily  benzonatate 100 milliGRAM(s) Oral three times a day PRN  dextrose 40% Gel 15 Gram(s) Oral once PRN  dextrose 50% Injectable 12.5 Gram(s) IV Push once  dextrose 50% Injectable 25 Gram(s) IV Push once  dextrose 50% Injectable 25 Gram(s) IV Push once  enoxaparin Injectable 40 milliGRAM(s) SubCutaneous daily  furosemide    Tablet 40 milliGRAM(s) Oral daily  glucagon  Injectable 1 milliGRAM(s) IntraMuscular once PRN  hydrochlorothiazide 25 milliGRAM(s) Oral daily  hydroxychloroquine   Oral   hydroxychloroquine 200 milliGRAM(s) Oral every 12 hours  hydrOXYzine hydrochloride 25 milliGRAM(s) Oral at bedtime PRN  insulin lispro (HumaLOG) corrective regimen sliding scale   SubCutaneous three times a day before meals  levothyroxine 25 MICROGram(s) Oral daily  losartan 100 milliGRAM(s) Oral daily  methylPREDNISolone sodium succinate Injectable 40 milliGRAM(s) IV Push two times a day  metoprolol succinate ER 25 milliGRAM(s) Oral daily  PARoxetine 20 milliGRAM(s) Oral at bedtime  zinc sulfate 220 milliGRAM(s) Oral daily      Physical Exam:  T(C): 36.3 (04-07-20 @ 13:40), Max: 36.6 (04-07-20 @ 05:45)  HR: 102 (04-07-20 @ 13:40) (90 - 102)  BP: 103/81 (04-07-20 @ 13:40) (103/81 - 136/68)  RR: 20 (04-07-20 @ 13:40) (20 - 20)  SpO2: 95% (04-07-20 @ 13:40) (95% - 98%)    Comfortable appearing  On NRB  No respiratory distress    Labs: reviewed                        13.2   10.87 )-----------( 427      ( 07 Apr 2020 11:30 )             36.4     04-07    137  |  96<L>  |  55<H>  ----------------------------<  194<H>  3.7   |  24  |  1.83<H>    Ca    10.7<H>      07 Apr 2020 11:30  Phos  4.2     04-07  Mg     1.8     04-07    TPro  7.9  /  Alb  3.6  /  TBili  0.9  /  DBili  x   /  AST  27  /  ALT  24  /  AlkPhos  59  04-07    LIVER FUNCTIONS - ( 07 Apr 2020 11:30 )  Alb: 3.6 g/dL / Pro: 7.9 g/dL / ALK PHOS: 59 u/L / ALT: 24 u/L / AST: 27 u/L / GGT: x               Diagnostic Studies: reviewed        Assessment and Plan:   · Assessment		  74F w/ DMII, hypothyroidism, CVA, p/w fevers, chills, cough, dyspnea, found to be COVID-19 positive.    COVID-19 Test: +4/3  Oxygen Support: NRB  Hydroxychloroquine: starting 4/5  Steroids: starting 4/5  Anakinra: considering starting 4/8    Problem/Plan:  #Hypoxic respiratory failure 2/2 COVID-19 infection  - supplemental O2 via NRB as needed  - Tylenol PRN  - Albuterol PRN  - awaiting inflammatory markers 4/8, if elevated will start anakinra (per d/w Dr. Ríos of [place])  Labs ordered for tomorrow    Hyroxychloroquin stopped due to mild QT prolongation  Solumedrol since 4/4    #EVELIA - likely 2/2 viral infection  - trend Cr, IVF PRN  - hold lasix/losartan    #Chronic Medical Problems:  - DM: ISS, f/u FS  - HTN: holding losartan and lasix in setting of EVELIA, c/w amlodipine  - Hypothyroidism: c/w synthroid  - Insomnia - c/w paroxetine, hydroxyzine PRN    #FEN/PPx  - heparin SubQ  - regular diet    #GOC – pt would not want to be intubated in case of clinical deterioration; discussed w/ family as well Progress Note:   · Provider Specialty	Internal Medicine	    Reason for Admission:    Reason for Admission:  · Reason for Admission	SOB, fever	      · Subjective and Objective: 	  Subjective/Interval Events: Pt feeling better today, reports decreased SOB. Remains on NRB.     On NRB 02 high 90’s    Translation phone used    Comfortable    Supine    Eating    Plaquenil stopped today due to mild prolonged QT, had started 4/4    Solumedrol started 4/4    Hospital Medications:  acetaminophen   Tablet .. 650 milliGRAM(s) Oral every 4 hours PRN  amLODIPine   Tablet 5 milliGRAM(s) Oral daily  benzonatate 100 milliGRAM(s) Oral three times a day PRN  dextrose 40% Gel 15 Gram(s) Oral once PRN  dextrose 50% Injectable 12.5 Gram(s) IV Push once  dextrose 50% Injectable 25 Gram(s) IV Push once  dextrose 50% Injectable 25 Gram(s) IV Push once  enoxaparin Injectable 40 milliGRAM(s) SubCutaneous daily  furosemide    Tablet 40 milliGRAM(s) Oral daily  glucagon  Injectable 1 milliGRAM(s) IntraMuscular once PRN  hydrochlorothiazide 25 milliGRAM(s) Oral daily  hydroxychloroquine   Oral   hydroxychloroquine 200 milliGRAM(s) Oral every 12 hours  hydrOXYzine hydrochloride 25 milliGRAM(s) Oral at bedtime PRN  insulin lispro (HumaLOG) corrective regimen sliding scale   SubCutaneous three times a day before meals  levothyroxine 25 MICROGram(s) Oral daily  losartan 100 milliGRAM(s) Oral daily  methylPREDNISolone sodium succinate Injectable 40 milliGRAM(s) IV Push two times a day  metoprolol succinate ER 25 milliGRAM(s) Oral daily  PARoxetine 20 milliGRAM(s) Oral at bedtime  zinc sulfate 220 milliGRAM(s) Oral daily      Physical Exam:  T(C): 36.3 (04-07-20 @ 13:40), Max: 36.6 (04-07-20 @ 05:45)  HR: 102 (04-07-20 @ 13:40) (90 - 102)  BP: 103/81 (04-07-20 @ 13:40) (103/81 - 136/68)  RR: 20 (04-07-20 @ 13:40) (20 - 20)  SpO2: 95% (04-07-20 @ 13:40) (95% - 98%)    Comfortable appearing  On NRB  No respiratory distress    Labs: reviewed                        13.2   10.87 )-----------( 427      ( 07 Apr 2020 11:30 )             36.4     04-07    137  |  96<L>  |  55<H>  ----------------------------<  194<H>  3.7   |  24  |  1.83<H>    Ca    10.7<H>      07 Apr 2020 11:30  Phos  4.2     04-07  Mg     1.8     04-07    TPro  7.9  /  Alb  3.6  /  TBili  0.9  /  DBili  x   /  AST  27  /  ALT  24  /  AlkPhos  59  04-07    LIVER FUNCTIONS - ( 07 Apr 2020 11:30 )  Alb: 3.6 g/dL / Pro: 7.9 g/dL / ALK PHOS: 59 u/L / ALT: 24 u/L / AST: 27 u/L / GGT: x               Diagnostic Studies: reviewed        Assessment and Plan:   · Assessment		  74F w/ DMII, hypothyroidism, CVA, p/w fevers, chills, cough, dyspnea, found to be COVID-19 positive.    COVID-19 Test: +4/3  Oxygen Support: NRB  Hydroxychloroquine: starting 4/5  Steroids: starting 4/5  Anakinra: considering starting 4/8    Problem/Plan:  #Hypoxic respiratory failure 2/2 COVID-19 infection  - supplemental O2 via NRB as needed  - Tylenol PRN  - Albuterol PRN  - awaiting inflammatory markers 4/8, if elevated will start anakinra (per d/w Dr. Ríos of [place])  Labs ordered for tomorrow    Hyroxychloroquin stopped due to mild QT prolongation  Solumedrol since 4/4    #EVELIA - likely 2/2 viral infection  - trend Cr, IVF PRN  - hold lasix/losartan    #Chronic Medical Problems:  - DM: ISS, f/u FS  - HTN: holding losartan and lasix in setting of EVELIA, c/w amlodipine  - Hypothyroidism: c/w synthroid  - Insomnia - c/w paroxetine, hydroxyzine PRN    #FEN/PPx  - heparin SubQ  - regular diet    #GOC – pt would not want to be intubated in case of clinical deterioration; discussed w/ family as well      I spoke with daughter now with  phone

## 2020-04-09 DIAGNOSIS — Z71.89 OTHER SPECIFIED COUNSELING: ICD-10-CM

## 2020-04-09 LAB
ALBUMIN SERPL ELPH-MCNC: 3.9 G/DL — SIGNIFICANT CHANGE UP (ref 3.3–5)
ALP SERPL-CCNC: 62 U/L — SIGNIFICANT CHANGE UP (ref 40–120)
ALT FLD-CCNC: 47 U/L — HIGH (ref 4–33)
ANION GAP SERPL CALC-SCNC: 19 MMO/L — HIGH (ref 7–14)
AST SERPL-CCNC: 49 U/L — HIGH (ref 4–32)
BILIRUB SERPL-MCNC: 0.8 MG/DL — SIGNIFICANT CHANGE UP (ref 0.2–1.2)
BUN SERPL-MCNC: 59 MG/DL — HIGH (ref 7–23)
BUN SERPL-MCNC: 59 MG/DL — HIGH (ref 7–23)
BUN SERPL-MCNC: 60 MG/DL — HIGH (ref 7–23)
CALCIUM SERPL-MCNC: 10.6 MG/DL — HIGH (ref 8.4–10.5)
CALCIUM SERPL-MCNC: 10.6 MG/DL — HIGH (ref 8.4–10.5)
CALCIUM SERPL-MCNC: 10.9 MG/DL — HIGH (ref 8.4–10.5)
CHLORIDE SERPL-SCNC: 94 MMOL/L — LOW (ref 98–107)
CHLORIDE SERPL-SCNC: 94 MMOL/L — LOW (ref 98–107)
CHLORIDE SERPL-SCNC: 96 MMOL/L — LOW (ref 98–107)
CO2 SERPL-SCNC: 23 MMOL/L — SIGNIFICANT CHANGE UP (ref 22–31)
CO2 SERPL-SCNC: 24 MMOL/L — SIGNIFICANT CHANGE UP (ref 22–31)
CO2 SERPL-SCNC: 24 MMOL/L — SIGNIFICANT CHANGE UP (ref 22–31)
CREAT SERPL-MCNC: 1.6 MG/DL — HIGH (ref 0.5–1.3)
CRP SERPL-MCNC: 12.2 MG/L — HIGH
CRP SERPL-MCNC: 15 MG/L — HIGH
D DIMER BLD IA.RAPID-MCNC: 211 NG/ML — SIGNIFICANT CHANGE UP
D DIMER BLD IA.RAPID-MCNC: 264 NG/ML — SIGNIFICANT CHANGE UP
FERRITIN SERPL-MCNC: 789.6 NG/ML — HIGH (ref 15–150)
GLUCOSE BLDC GLUCOMTR-MCNC: 125 MG/DL — HIGH (ref 70–99)
GLUCOSE BLDC GLUCOMTR-MCNC: 163 MG/DL — HIGH (ref 70–99)
GLUCOSE BLDC GLUCOMTR-MCNC: 272 MG/DL — HIGH (ref 70–99)
GLUCOSE SERPL-MCNC: 122 MG/DL — HIGH (ref 70–99)
GLUCOSE SERPL-MCNC: 211 MG/DL — HIGH (ref 70–99)
GLUCOSE SERPL-MCNC: 211 MG/DL — HIGH (ref 70–99)
HCT VFR BLD CALC: 40.6 % — SIGNIFICANT CHANGE UP (ref 34.5–45)
HCT VFR BLD CALC: 42.1 % — SIGNIFICANT CHANGE UP (ref 34.5–45)
HGB BLD-MCNC: 14.5 G/DL — SIGNIFICANT CHANGE UP (ref 11.5–15.5)
HGB BLD-MCNC: 14.7 G/DL — SIGNIFICANT CHANGE UP (ref 11.5–15.5)
MAGNESIUM SERPL-MCNC: 2 MG/DL — SIGNIFICANT CHANGE UP (ref 1.6–2.6)
MCHC RBC-ENTMCNC: 32.3 PG — SIGNIFICANT CHANGE UP (ref 27–34)
MCHC RBC-ENTMCNC: 32.4 PG — SIGNIFICANT CHANGE UP (ref 27–34)
MCHC RBC-ENTMCNC: 34.9 % — SIGNIFICANT CHANGE UP (ref 32–36)
MCHC RBC-ENTMCNC: 35.7 % — SIGNIFICANT CHANGE UP (ref 32–36)
MCV RBC AUTO: 90.6 FL — SIGNIFICANT CHANGE UP (ref 80–100)
MCV RBC AUTO: 92.5 FL — SIGNIFICANT CHANGE UP (ref 80–100)
NRBC # FLD: 0 K/UL — SIGNIFICANT CHANGE UP (ref 0–0)
NRBC # FLD: 0 K/UL — SIGNIFICANT CHANGE UP (ref 0–0)
PHOSPHATE SERPL-MCNC: 3.3 MG/DL — SIGNIFICANT CHANGE UP (ref 2.5–4.5)
PLATELET # BLD AUTO: 384 K/UL — SIGNIFICANT CHANGE UP (ref 150–400)
PLATELET # BLD AUTO: 457 K/UL — HIGH (ref 150–400)
PMV BLD: 8.9 FL — SIGNIFICANT CHANGE UP (ref 7–13)
PMV BLD: 9.7 FL — SIGNIFICANT CHANGE UP (ref 7–13)
POTASSIUM SERPL-MCNC: 2.8 MMOL/L — CRITICAL LOW (ref 3.5–5.3)
POTASSIUM SERPL-MCNC: 3.5 MMOL/L — SIGNIFICANT CHANGE UP (ref 3.5–5.3)
POTASSIUM SERPL-MCNC: 3.5 MMOL/L — SIGNIFICANT CHANGE UP (ref 3.5–5.3)
POTASSIUM SERPL-SCNC: 2.8 MMOL/L — CRITICAL LOW (ref 3.5–5.3)
POTASSIUM SERPL-SCNC: 3.5 MMOL/L — SIGNIFICANT CHANGE UP (ref 3.5–5.3)
POTASSIUM SERPL-SCNC: 3.5 MMOL/L — SIGNIFICANT CHANGE UP (ref 3.5–5.3)
PROCALCITONIN SERPL-MCNC: 0.11 NG/ML — HIGH (ref 0.02–0.1)
PROCALCITONIN SERPL-MCNC: 0.14 NG/ML — HIGH (ref 0.02–0.1)
PROT SERPL-MCNC: 8.3 G/DL — SIGNIFICANT CHANGE UP (ref 6–8.3)
RBC # BLD: 4.48 M/UL — SIGNIFICANT CHANGE UP (ref 3.8–5.2)
RBC # BLD: 4.55 M/UL — SIGNIFICANT CHANGE UP (ref 3.8–5.2)
RBC # FLD: 11.9 % — SIGNIFICANT CHANGE UP (ref 10.3–14.5)
RBC # FLD: 11.9 % — SIGNIFICANT CHANGE UP (ref 10.3–14.5)
SODIUM SERPL-SCNC: 137 MMOL/L — SIGNIFICANT CHANGE UP (ref 135–145)
SODIUM SERPL-SCNC: 137 MMOL/L — SIGNIFICANT CHANGE UP (ref 135–145)
SODIUM SERPL-SCNC: 138 MMOL/L — SIGNIFICANT CHANGE UP (ref 135–145)
WBC # BLD: 10.62 K/UL — HIGH (ref 3.8–10.5)
WBC # BLD: 11.09 K/UL — HIGH (ref 3.8–10.5)
WBC # FLD AUTO: 10.62 K/UL — HIGH (ref 3.8–10.5)
WBC # FLD AUTO: 11.09 K/UL — HIGH (ref 3.8–10.5)

## 2020-04-09 PROCEDURE — 99232 SBSQ HOSP IP/OBS MODERATE 35: CPT

## 2020-04-09 RX ORDER — POTASSIUM CHLORIDE 20 MEQ
20 PACKET (EA) ORAL
Refills: 0 | Status: COMPLETED | OUTPATIENT
Start: 2020-04-09 | End: 2020-04-09

## 2020-04-09 RX ADMIN — Medication 20 MILLIEQUIVALENT(S): at 21:13

## 2020-04-09 RX ADMIN — Medication 1: at 13:07

## 2020-04-09 RX ADMIN — ZINC SULFATE TAB 220 MG (50 MG ZINC EQUIVALENT) 220 MILLIGRAM(S): 220 (50 ZN) TAB at 11:37

## 2020-04-09 RX ADMIN — Medication 1: at 09:21

## 2020-04-09 RX ADMIN — ENOXAPARIN SODIUM 40 MILLIGRAM(S): 100 INJECTION SUBCUTANEOUS at 11:37

## 2020-04-09 RX ADMIN — Medication 40 MILLIGRAM(S): at 04:32

## 2020-04-09 RX ADMIN — Medication 20 MILLIGRAM(S): at 21:13

## 2020-04-09 RX ADMIN — Medication 20 MILLIEQUIVALENT(S): at 23:17

## 2020-04-09 RX ADMIN — AMLODIPINE BESYLATE 5 MILLIGRAM(S): 2.5 TABLET ORAL at 04:32

## 2020-04-09 RX ADMIN — Medication 25 MILLIGRAM(S): at 04:32

## 2020-04-09 RX ADMIN — Medication 40 MILLIGRAM(S): at 23:17

## 2020-04-09 RX ADMIN — Medication 25 MICROGRAM(S): at 04:32

## 2020-04-09 NOTE — PROGRESS NOTE ADULT - SUBJECTIVE AND OBJECTIVE BOX
Progress Note Adult-Internal Medicine Attending [Charted Location: [facility] LT8T 811 ] [Authored: 09-Apr-2020 16:53]- for Visit: [id number], Complete, Revised, Signed in Full, General    Progress Note:   · Provider Specialty	Internal Medicine	    Reason for Admission:    Reason for Admission:  · Reason for Admission	SOB, fever	      · Subjective and Objective: 	  Progress Note:   · Provider Specialty	Internal Medicine	    Reason for Admission:    Reason for Admission:  · Reason for Admission	SOB, fever	      · Subjective and Objective: 	  Subjective/Interval Events: Pt feeling better today, reports decreased SOB. Remains on NRB.     On NRB 02 high 88 when I entered room, but went up to 100 with proper reposition of mask    AF    P 51    Supine    Creatinine clearance is 39    Anakirna discussed with ACP and pharmacy, pharmacy suggests repeat labs and possible ½ dose, will decide based on labs and clinical status tomorrow    Translation phone used    Comfortable    Supine    Eating    Plaquenil stopped today due to mild prolonged QT, had started 4/4    Solumedrol started 4/4    Hospital Medications:  acetaminophen   Tablet .. 650 milliGRAM(s) Oral every 4 hours PRN  amLODIPine   Tablet 5 milliGRAM(s) Oral daily  benzonatate 100 milliGRAM(s) Oral three times a day PRN  dextrose 40% Gel 15 Gram(s) Oral once PRN  dextrose 50% Injectable 12.5 Gram(s) IV Push once  dextrose 50% Injectable 25 Gram(s) IV Push once  dextrose 50% Injectable 25 Gram(s) IV Push once  enoxaparin Injectable 40 milliGRAM(s) SubCutaneous daily  furosemide    Tablet 40 milliGRAM(s) Oral daily  glucagon  Injectable 1 milliGRAM(s) IntraMuscular once PRN  hydrochlorothiazide 25 milliGRAM(s) Oral daily  hydroxychloroquine   Oral   hydroxychloroquine 200 milliGRAM(s) Oral every 12 hours  hydrOXYzine hydrochloride 25 milliGRAM(s) Oral at bedtime PRN  insulin lispro (HumaLOG) corrective regimen sliding scale   SubCutaneous three times a day before meals  levothyroxine 25 MICROGram(s) Oral daily  losartan 100 milliGRAM(s) Oral daily  methylPREDNISolone sodium succinate Injectable 40 milliGRAM(s) IV Push two times a day  metoprolol succinate ER 25 milliGRAM(s) Oral daily  PARoxetine 20 milliGRAM(s) Oral at bedtime  zinc sulfate 220 milliGRAM(s) Oral daily      Physical Exam:  T(C): 36.3 (04-07-20 @ 13:40), Max: 36.6 (04-07-20 @ 05:45)  HR: 102 (04-07-20 @ 13:40) (90 - 102)  BP: 103/81 (04-07-20 @ 13:40) (103/81 - 136/68)  RR: 20 (04-07-20 @ 13:40) (20 - 20)  SpO2: 95% (04-07-20 @ 13:40) (95% - 98%)    Comfortable appearing  On NRB  No respiratory distress    Labs: reviewed                        13.2   10.87 )-----------( 427      ( 07 Apr 2020 11:30 )             36.4     04-07    137  |  96<L>  |  55<H>  ----------------------------<  194<H>  3.7   |  24  |  1.83<H>    Ca    10.7<H>      07 Apr 2020 11:30  Phos  4.2     04-07  Mg     1.8     04-07    TPro  7.9  /  Alb  3.6  /  TBili  0.9  /  DBili  x   /  AST  27  /  ALT  24  /  AlkPhos  59  04-07    LIVER FUNCTIONS - ( 07 Apr 2020 11:30 )  Alb: 3.6 g/dL / Pro: 7.9 g/dL / ALK PHOS: 59 u/L / ALT: 24 u/L / AST: 27 u/L / GGT: x               Diagnostic Studies: reviewed        Assessment and Plan:   · Assessment		  74F w/ DMII, hypothyroidism, CVA, p/w fevers, chills, cough, dyspnea, found to be COVID-19 positive.    COVID-19 Test: +4/3  Oxygen Support: NRB  Hydroxychloroquine: starting 4/5  Steroids: starting 4/5    Anakinra, being considered,  Creatinine clearance is 39    Anakirna discussed with ACP and pharmacy, pharmacy suggests repeat labs and possible ½ dose, will decide based on labs and clinical status tomorrow    Problem/Plan:  #Hypoxic respiratory failure 2/2 COVID-19 infection  - supplemental O2 via NRB as needed  - Tylenol PRN  - Albuterol PRN  - awaiting inflammatory markers 4/8, if elevated will start anakinra (per d/w Dr. Ríos of [place])  Labs ordered for tomorrow    Hyroxychloroquin stopped due to mild QT prolongation  Solumedrol since 4/4    #EVELIA - likely 2/2 viral infection  - trend Cr, IVF PRN  - hold lasix/losartan    #Chronic Medical Problems:  - DM: ISS, f/u FS  - HTN: holding losartan and lasix in setting of EVELIA, c/w amlodipine  - Hypothyroidism: c/w synthroid  - Insomnia - c/w paroxetine, hydroxyzine PRN    #FEN/PPx  - heparin SubQ  - regular diet    #GOC – pt would not want to be intubated in case of clinical deterioration; discussed w/ family as well Progress Note Adult-Internal Medicine Attending [Charted Location: [facility] LT8T 811 ] [Authored: 09-Apr-2020 16:53]- for Visit: [id number], Complete, Revised, Signed in Full, General    Progress Note:   · Provider Specialty	Internal Medicine	    Reason for Admission:    Reason for Admission:  · Reason for Admission	SOB, fever	      · Subjective and Objective: 	  Progress Note:   · Provider Specialty	Internal Medicine	    Reason for Admission:    Reason for Admission:  · Reason for Admission	SOB, fever	      · Subjective and Objective: 	  Subjective/Interval Events: Pt feeling better today, reports decreased SOB. Remains on NRB.     On NRB 02 high 88 when I entered room, but went up to 100 with proper reposition of mask    AF    P 51    Supine    Creatinine clearance is 39    Anakirna discussed with ACP and pharmacy, pharmacy suggests repeat labs and possible ½ dose, will decide based on labs and clinical status tomorrow    Translation phone used    Comfortable    Supine    Eating    Plaquenil stopped today due to mild prolonged QT, had started 4/4    Solumedrol started 4/4    Hospital Medications:  acetaminophen   Tablet .. 650 milliGRAM(s) Oral every 4 hours PRN  amLODIPine   Tablet 5 milliGRAM(s) Oral daily  benzonatate 100 milliGRAM(s) Oral three times a day PRN  dextrose 40% Gel 15 Gram(s) Oral once PRN  dextrose 50% Injectable 12.5 Gram(s) IV Push once  dextrose 50% Injectable 25 Gram(s) IV Push once  dextrose 50% Injectable 25 Gram(s) IV Push once  enoxaparin Injectable 40 milliGRAM(s) SubCutaneous daily  furosemide    Tablet 40 milliGRAM(s) Oral daily  glucagon  Injectable 1 milliGRAM(s) IntraMuscular once PRN  hydrochlorothiazide 25 milliGRAM(s) Oral daily  hydroxychloroquine   Oral   hydroxychloroquine 200 milliGRAM(s) Oral every 12 hours  hydrOXYzine hydrochloride 25 milliGRAM(s) Oral at bedtime PRN  insulin lispro (HumaLOG) corrective regimen sliding scale   SubCutaneous three times a day before meals  levothyroxine 25 MICROGram(s) Oral daily  losartan 100 milliGRAM(s) Oral daily  methylPREDNISolone sodium succinate Injectable 40 milliGRAM(s) IV Push two times a day  metoprolol succinate ER 25 milliGRAM(s) Oral daily  PARoxetine 20 milliGRAM(s) Oral at bedtime  zinc sulfate 220 milliGRAM(s) Oral daily      Physical Exam:  T(C): 36.3 (04-07-20 @ 13:40), Max: 36.6 (04-07-20 @ 05:45)  HR: 102 (04-07-20 @ 13:40) (90 - 102)  BP: 103/81 (04-07-20 @ 13:40) (103/81 - 136/68)  RR: 20 (04-07-20 @ 13:40) (20 - 20)  SpO2: 95% (04-07-20 @ 13:40) (95% - 98%)    Comfortable appearing  On NRB  No respiratory distress    Labs: reviewed                        13.2   10.87 )-----------( 427      ( 07 Apr 2020 11:30 )             36.4     04-07    137  |  96<L>  |  55<H>  ----------------------------<  194<H>  3.7   |  24  |  1.83<H>    Ca    10.7<H>      07 Apr 2020 11:30  Phos  4.2     04-07  Mg     1.8     04-07    TPro  7.9  /  Alb  3.6  /  TBili  0.9  /  DBili  x   /  AST  27  /  ALT  24  /  AlkPhos  59  04-07    LIVER FUNCTIONS - ( 07 Apr 2020 11:30 )  Alb: 3.6 g/dL / Pro: 7.9 g/dL / ALK PHOS: 59 u/L / ALT: 24 u/L / AST: 27 u/L / GGT: x               Diagnostic Studies: reviewed        Assessment and Plan:   · Assessment		  74F w/ DMII, hypothyroidism, CVA, p/w fevers, chills, cough, dyspnea, found to be COVID-19 positive.    COVID-19 Test: +4/3  Oxygen Support: NRB  Hydroxychloroquine: starting 4/5  Steroids: starting 4/5    Anakinra, being considered,  Creatinine clearance is 39    Anakirna discussed with ACP and pharmacy, pharmacy suggests repeat labs and possible ½ dose, will decide based on labs and clinical status tomorrow    Problem/Plan:  #Hypoxic respiratory failure 2/2 COVID-19 infection  - supplemental O2 via NRB as needed  - Tylenol PRN  - Albuterol PRN  - awaiting inflammatory markers 4/8, if elevated will start anakinra (per d/w Dr. Ríos of [place])  Labs ordered for tomorrow    Hyroxychloroquin stopped due to mild QT prolongation  Solumedrol since 4/4    #EVELIA - likely 2/2 viral infection  - trend Cr, IVF PRN  - hold lasix/losartan    #Chronic Medical Problems:  - DM: ISS, f/u FS  - HTN: holding losartan and lasix in setting of EVELIA, c/w amlodipine  - Hypothyroidism: c/w synthroid  - Insomnia - c/w paroxetine, hydroxyzine PRN    #FEN/PPx  - heparin SubQ  - regular diet    #GOC – pt would not want to be intubated in case of clinical deterioration; discussed w/ family as well    I spoke with daughter with phone

## 2020-04-10 LAB
ALBUMIN SERPL ELPH-MCNC: 3.7 G/DL — SIGNIFICANT CHANGE UP (ref 3.3–5)
ALP SERPL-CCNC: 61 U/L — SIGNIFICANT CHANGE UP (ref 40–120)
ALT FLD-CCNC: 54 U/L — HIGH (ref 4–33)
ANION GAP SERPL CALC-SCNC: 18 MMO/L — HIGH (ref 7–14)
AST SERPL-CCNC: 41 U/L — HIGH (ref 4–32)
BILIRUB SERPL-MCNC: 0.7 MG/DL — SIGNIFICANT CHANGE UP (ref 0.2–1.2)
BUN SERPL-MCNC: 64 MG/DL — HIGH (ref 7–23)
CALCIUM SERPL-MCNC: 11.3 MG/DL — HIGH (ref 8.4–10.5)
CHLORIDE SERPL-SCNC: 98 MMOL/L — SIGNIFICANT CHANGE UP (ref 98–107)
CO2 SERPL-SCNC: 22 MMOL/L — SIGNIFICANT CHANGE UP (ref 22–31)
CREAT SERPL-MCNC: 1.6 MG/DL — HIGH (ref 0.5–1.3)
CRP SERPL-MCNC: 10 MG/L — HIGH
FERRITIN SERPL-MCNC: 1024 NG/ML — HIGH (ref 15–150)
GLUCOSE BLDC GLUCOMTR-MCNC: 263 MG/DL — HIGH (ref 70–99)
GLUCOSE BLDC GLUCOMTR-MCNC: 267 MG/DL — HIGH (ref 70–99)
GLUCOSE BLDC GLUCOMTR-MCNC: 293 MG/DL — HIGH (ref 70–99)
GLUCOSE SERPL-MCNC: 277 MG/DL — HIGH (ref 70–99)
HCT VFR BLD CALC: 40 % — SIGNIFICANT CHANGE UP (ref 34.5–45)
HGB BLD-MCNC: 14 G/DL — SIGNIFICANT CHANGE UP (ref 11.5–15.5)
MAGNESIUM SERPL-MCNC: 2.1 MG/DL — SIGNIFICANT CHANGE UP (ref 1.6–2.6)
MCHC RBC-ENTMCNC: 31.9 PG — SIGNIFICANT CHANGE UP (ref 27–34)
MCHC RBC-ENTMCNC: 35 % — SIGNIFICANT CHANGE UP (ref 32–36)
MCV RBC AUTO: 91.1 FL — SIGNIFICANT CHANGE UP (ref 80–100)
NRBC # FLD: 0 K/UL — SIGNIFICANT CHANGE UP (ref 0–0)
PHOSPHATE SERPL-MCNC: 2.7 MG/DL — SIGNIFICANT CHANGE UP (ref 2.5–4.5)
PLATELET # BLD AUTO: 424 K/UL — HIGH (ref 150–400)
PMV BLD: 9.7 FL — SIGNIFICANT CHANGE UP (ref 7–13)
POTASSIUM SERPL-MCNC: 4.7 MMOL/L — SIGNIFICANT CHANGE UP (ref 3.5–5.3)
POTASSIUM SERPL-SCNC: 4.7 MMOL/L — SIGNIFICANT CHANGE UP (ref 3.5–5.3)
PROCALCITONIN SERPL-MCNC: 0.13 NG/ML — HIGH (ref 0.02–0.1)
PROT SERPL-MCNC: 8.3 G/DL — SIGNIFICANT CHANGE UP (ref 6–8.3)
RBC # BLD: 4.39 M/UL — SIGNIFICANT CHANGE UP (ref 3.8–5.2)
RBC # FLD: 11.8 % — SIGNIFICANT CHANGE UP (ref 10.3–14.5)
SODIUM SERPL-SCNC: 138 MMOL/L — SIGNIFICANT CHANGE UP (ref 135–145)
WBC # BLD: 9.01 K/UL — SIGNIFICANT CHANGE UP (ref 3.8–10.5)
WBC # FLD AUTO: 9.01 K/UL — SIGNIFICANT CHANGE UP (ref 3.8–10.5)

## 2020-04-10 PROCEDURE — 99232 SBSQ HOSP IP/OBS MODERATE 35: CPT

## 2020-04-10 RX ORDER — SODIUM CHLORIDE 9 MG/ML
1000 INJECTION INTRAMUSCULAR; INTRAVENOUS; SUBCUTANEOUS
Refills: 0 | Status: DISCONTINUED | OUTPATIENT
Start: 2020-04-10 | End: 2020-04-11

## 2020-04-10 RX ADMIN — Medication 25 MILLIGRAM(S): at 04:44

## 2020-04-10 RX ADMIN — Medication 3: at 18:04

## 2020-04-10 RX ADMIN — ENOXAPARIN SODIUM 40 MILLIGRAM(S): 100 INJECTION SUBCUTANEOUS at 12:11

## 2020-04-10 RX ADMIN — Medication 3: at 09:20

## 2020-04-10 RX ADMIN — SODIUM CHLORIDE 50 MILLILITER(S): 9 INJECTION INTRAMUSCULAR; INTRAVENOUS; SUBCUTANEOUS at 18:15

## 2020-04-10 RX ADMIN — Medication 20 MILLIGRAM(S): at 23:25

## 2020-04-10 RX ADMIN — Medication 40 MILLIGRAM(S): at 18:04

## 2020-04-10 RX ADMIN — Medication 3: at 12:11

## 2020-04-10 RX ADMIN — Medication 25 MICROGRAM(S): at 04:44

## 2020-04-10 RX ADMIN — Medication 40 MILLIGRAM(S): at 04:44

## 2020-04-10 RX ADMIN — SODIUM CHLORIDE 50 MILLILITER(S): 9 INJECTION INTRAMUSCULAR; INTRAVENOUS; SUBCUTANEOUS at 23:25

## 2020-04-10 RX ADMIN — ZINC SULFATE TAB 220 MG (50 MG ZINC EQUIVALENT) 220 MILLIGRAM(S): 220 (50 ZN) TAB at 12:12

## 2020-04-10 RX ADMIN — AMLODIPINE BESYLATE 5 MILLIGRAM(S): 2.5 TABLET ORAL at 04:44

## 2020-04-10 NOTE — PROGRESS NOTE ADULT - ASSESSMENT
c Problem/Plan:  #Hypoxic respiratory failure 2/2 COVID-19 infection  - supplemental O2 via4 l NC today, down from NRB yesterday  - Tylenol PRN  - Albuterol PRN    Creat clearance yestrday 39    Will repeat renal function    encourage PO fluids,  cc 1/2normal saline today to be given, possible fluid depletion  -   Hyroxychloroquin stopped due to mild QT prolongation  Solumedrol since 4/4    #EVELIA - likely 2/2 viral infection  - trend Cr, IVF PRN  - hold lasix/losartan    #Chronic Medical Problems:  - DM: ISS, f/u FS  - HTN: holding losartan and lasix in setting of EVELIA, c/w amlodipine  - Hypothyroidism: c/w synthroid  - Insomnia - c/w paroxetine, hydroxyzine PRN    #FEN/PPx  - heparin SubQ  - regular diet    #GOC – pt would not want to be intubated in case of clinical deterioration; discussed w/ family as well    I spoke with daughter and patient with phone  Problem/Plan:  #Hypoxic respiratory failure 2/2 COVID-19 infection  - supplemental O2 via4 l NC today, down from NRB yesterday  - Tylenol PRN  - Albuterol PRN    Creat clearance yestrday 39    Will repeat renal function    encourage PO fluids,  cc normal saline today to be given, possible fluid depletion  -   Hyroxychloroquin stopped due to mild QT prolongation  Solumedrol since 4/4    #EVELIA - likely 2/2 viral infection  - trend Cr,   - hold lasix/losartan    #Chronic Medical Problems:  - DM: ISS, f/u FS  - HTN: holding losartan and lasix in setting of EVELIA, c/w amlodipine  - Hypothyroidism: c/w synthroid  - Insomnia - c/w paroxetine, hydroxyzine PRN    #FEN/PPx  - heparin SubQ  - regular diet    #GOC – pt would not want to be intubated in case of clinical deterioration; discussed w/ family as well    I spoke with daughter and patient with phone

## 2020-04-10 NOTE — PROGRESS NOTE ADULT - SUBJECTIVE AND OBJECTIVE BOX
Subjective/Interval Events:    Breathing well on NC 4 L NC, down from NRB yesterday    RN to try to ambulate on RA tomorrow    Creatinine clearance 39    Will not give Anakinra    Eating/drinking    Spoke with Daughter and patient with     Plaquenil stopped yesterday due to mild prolonged QT    Solumedrol started 4/4     Hospital Medications:  acetaminophen   Tablet .. 650 milliGRAM(s) Oral every 4 hours PRN  acetaminophen   Tablet .. 650 milliGRAM(s) Oral once  amLODIPine   Tablet 5 milliGRAM(s) Oral daily  benzonatate 100 milliGRAM(s) Oral three times a day PRN  dextrose 40% Gel 15 Gram(s) Oral once PRN  dextrose 50% Injectable 12.5 Gram(s) IV Push once  dextrose 50% Injectable 25 Gram(s) IV Push once  dextrose 50% Injectable 25 Gram(s) IV Push once  enoxaparin Injectable 40 milliGRAM(s) SubCutaneous daily  glucagon  Injectable 1 milliGRAM(s) IntraMuscular once PRN  hydrochlorothiazide 25 milliGRAM(s) Oral daily  hydrOXYzine hydrochloride 25 milliGRAM(s) Oral at bedtime PRN  insulin lispro (HumaLOG) corrective regimen sliding scale   SubCutaneous three times a day before meals  levothyroxine 25 MICROGram(s) Oral daily  methylPREDNISolone sodium succinate Injectable 40 milliGRAM(s) IV Push two times a day  metoprolol succinate ER 25 milliGRAM(s) Oral daily  PARoxetine 20 milliGRAM(s) Oral at bedtime  zinc sulfate 220 milliGRAM(s) Oral daily      Physical Exam:  T(C): 36.7 (04-10-20 @ 09:30), Max: 36.7 (04-10-20 @ 09:30)  HR: 59 (04-10-20 @ 09:30) (53 - 69)  BP: 144/70 (04-10-20 @ 09:30) (140/81 - 144/70)  RR: 22 (04-10-20 @ 09:30) (20 - 22)  SpO2: 100% (04-10-20 @ 15:59) (99% - 100%)    Comfortable appearing  On nasal cannula/On NRB  No respiratory distress    Labs: reviewed                        14.7   11.09 )-----------( 384      ( 09 Apr 2020 16:55 )             42.1     04-09    138  |  96<L>  |  60<H>  ----------------------------<  122<H>  2.8<LL>   |  23  |  1.60<H>    Ca    10.9<H>      09 Apr 2020 16:55  Phos  3.3     04-09  Mg     2.0     04-09    TPro  8.3  /  Alb  3.9  /  TBili  0.8  /  DBili  x   /  AST  49<H>  /  ALT  47<H>  /  AlkPhos  62  04-09    LIVER FUNCTIONS - ( 09 Apr 2020 16:55 )  Alb: 3.9 g/dL / Pro: 8.3 g/dL / ALK PHOS: 62 u/L / ALT: 47 u/L / AST: 49 u/L / GGT: x               Diagnostic Studies: reviewed

## 2020-04-11 LAB
ALBUMIN SERPL ELPH-MCNC: 3.7 G/DL — SIGNIFICANT CHANGE UP (ref 3.3–5)
ALP SERPL-CCNC: 54 U/L — SIGNIFICANT CHANGE UP (ref 40–120)
ALT FLD-CCNC: 48 U/L — HIGH (ref 4–33)
ANION GAP SERPL CALC-SCNC: 12 MMO/L — SIGNIFICANT CHANGE UP (ref 7–14)
AST SERPL-CCNC: 30 U/L — SIGNIFICANT CHANGE UP (ref 4–32)
BILIRUB SERPL-MCNC: 0.6 MG/DL — SIGNIFICANT CHANGE UP (ref 0.2–1.2)
BUN SERPL-MCNC: 54 MG/DL — HIGH (ref 7–23)
CALCIUM SERPL-MCNC: 10.5 MG/DL — SIGNIFICANT CHANGE UP (ref 8.4–10.5)
CHLORIDE SERPL-SCNC: 98 MMOL/L — SIGNIFICANT CHANGE UP (ref 98–107)
CO2 SERPL-SCNC: 23 MMOL/L — SIGNIFICANT CHANGE UP (ref 22–31)
CREAT SERPL-MCNC: 1.36 MG/DL — HIGH (ref 0.5–1.3)
GLUCOSE BLDC GLUCOMTR-MCNC: 258 MG/DL — HIGH (ref 70–99)
GLUCOSE BLDC GLUCOMTR-MCNC: 275 MG/DL — HIGH (ref 70–99)
GLUCOSE BLDC GLUCOMTR-MCNC: 322 MG/DL — HIGH (ref 70–99)
GLUCOSE SERPL-MCNC: 269 MG/DL — HIGH (ref 70–99)
HCT VFR BLD CALC: 37.3 % — SIGNIFICANT CHANGE UP (ref 34.5–45)
HGB BLD-MCNC: 13.4 G/DL — SIGNIFICANT CHANGE UP (ref 11.5–15.5)
MCHC RBC-ENTMCNC: 32.4 PG — SIGNIFICANT CHANGE UP (ref 27–34)
MCHC RBC-ENTMCNC: 35.9 % — SIGNIFICANT CHANGE UP (ref 32–36)
MCV RBC AUTO: 90.1 FL — SIGNIFICANT CHANGE UP (ref 80–100)
NRBC # FLD: 0 K/UL — SIGNIFICANT CHANGE UP (ref 0–0)
PLATELET # BLD AUTO: 420 K/UL — HIGH (ref 150–400)
PMV BLD: 9.6 FL — SIGNIFICANT CHANGE UP (ref 7–13)
POTASSIUM SERPL-MCNC: 4.3 MMOL/L — SIGNIFICANT CHANGE UP (ref 3.5–5.3)
POTASSIUM SERPL-SCNC: 4.3 MMOL/L — SIGNIFICANT CHANGE UP (ref 3.5–5.3)
PROT SERPL-MCNC: 7.2 G/DL — SIGNIFICANT CHANGE UP (ref 6–8.3)
RBC # BLD: 4.14 M/UL — SIGNIFICANT CHANGE UP (ref 3.8–5.2)
RBC # FLD: 11.9 % — SIGNIFICANT CHANGE UP (ref 10.3–14.5)
SODIUM SERPL-SCNC: 133 MMOL/L — LOW (ref 135–145)
WBC # BLD: 10.07 K/UL — SIGNIFICANT CHANGE UP (ref 3.8–10.5)
WBC # FLD AUTO: 10.07 K/UL — SIGNIFICANT CHANGE UP (ref 3.8–10.5)

## 2020-04-11 PROCEDURE — 99232 SBSQ HOSP IP/OBS MODERATE 35: CPT

## 2020-04-11 RX ADMIN — Medication 4: at 13:52

## 2020-04-11 RX ADMIN — ENOXAPARIN SODIUM 40 MILLIGRAM(S): 100 INJECTION SUBCUTANEOUS at 13:32

## 2020-04-11 RX ADMIN — Medication 25 MILLIGRAM(S): at 05:18

## 2020-04-11 RX ADMIN — Medication 40 MILLIGRAM(S): at 16:43

## 2020-04-11 RX ADMIN — AMLODIPINE BESYLATE 5 MILLIGRAM(S): 2.5 TABLET ORAL at 05:18

## 2020-04-11 RX ADMIN — Medication 40 MILLIGRAM(S): at 05:18

## 2020-04-11 RX ADMIN — Medication 20 MILLIGRAM(S): at 22:34

## 2020-04-11 RX ADMIN — ZINC SULFATE TAB 220 MG (50 MG ZINC EQUIVALENT) 220 MILLIGRAM(S): 220 (50 ZN) TAB at 13:32

## 2020-04-11 RX ADMIN — Medication 25 MILLIGRAM(S): at 09:35

## 2020-04-11 RX ADMIN — Medication 3: at 09:35

## 2020-04-11 RX ADMIN — Medication 25 MICROGRAM(S): at 05:18

## 2020-04-11 RX ADMIN — Medication 3: at 17:28

## 2020-04-11 NOTE — PROGRESS NOTE ADULT - SUBJECTIVE AND OBJECTIVE BOX
Subjective/Interval Events:    RA 02 sat only 89%    02 sat on 6 L NC 95%    RN to try to Ambulate on RA if 02 above 92%    o/w well    eating/drinking    I spoke with  , daughter and patient    Hospital Medications:  acetaminophen   Tablet .. 650 milliGRAM(s) Oral every 4 hours PRN  acetaminophen   Tablet .. 650 milliGRAM(s) Oral once  amLODIPine   Tablet 5 milliGRAM(s) Oral daily  benzonatate 100 milliGRAM(s) Oral three times a day PRN  dextrose 40% Gel 15 Gram(s) Oral once PRN  dextrose 50% Injectable 12.5 Gram(s) IV Push once  dextrose 50% Injectable 25 Gram(s) IV Push once  dextrose 50% Injectable 25 Gram(s) IV Push once  enoxaparin Injectable 40 milliGRAM(s) SubCutaneous daily  glucagon  Injectable 1 milliGRAM(s) IntraMuscular once PRN  hydrochlorothiazide 25 milliGRAM(s) Oral daily  hydrOXYzine hydrochloride 25 milliGRAM(s) Oral at bedtime PRN  insulin lispro (HumaLOG) corrective regimen sliding scale   SubCutaneous three times a day before meals  levothyroxine 25 MICROGram(s) Oral daily  methylPREDNISolone sodium succinate Injectable 40 milliGRAM(s) IV Push two times a day  metoprolol succinate ER 25 milliGRAM(s) Oral daily  PARoxetine 20 milliGRAM(s) Oral at bedtime  zinc sulfate 220 milliGRAM(s) Oral daily      Physical Exam:  T(C): 36.7 (04-11-20 @ 16:41), Max: 36.7 (04-11-20 @ 16:41)  HR: 80 (04-11-20 @ 16:41) (48 - 80)  BP: 138/72 (04-11-20 @ 16:41) (138/72 - 156/73)  RR: 16 (04-11-20 @ 16:41) (16 - 22)  SpO2: 94% (04-11-20 @ 16:41) (90% - 100%)    Comfortable appearing  On nasal cannula/On NRB  No respiratory distress    Labs: reviewed                        13.4   10.07 )-----------( 420      ( 11 Apr 2020 07:45 )             37.3     04-11    133<L>  |  98  |  54<H>  ----------------------------<  269<H>  4.3   |  23  |  1.36<H>    Ca    10.5      11 Apr 2020 08:20  Phos  2.7     04-10  Mg     2.1     04-10    TPro  7.2  /  Alb  3.7  /  TBili  0.6  /  DBili  x   /  AST  30  /  ALT  48<H>  /  AlkPhos  54  04-11    LIVER FUNCTIONS - ( 11 Apr 2020 08:20 )  Alb: 3.7 g/dL / Pro: 7.2 g/dL / ALK PHOS: 54 u/L / ALT: 48 u/L / AST: 30 u/L / GGT: x               Diagnostic Studies: reviewed

## 2020-04-11 NOTE — PROGRESS NOTE ADULT - ASSESSMENT
Problem/Plan:  #Hypoxic respiratory failure 2/2 COVID-19 infection  - supplemental O2 via 4 l NC today, RN to try to ambulate on RA if 02 above 92%  - Tylenol PRN  - Albuterol PRN    Creat clearance yestrday 39    Encourage PO fluids    Will repeat renal function    encourage PO fluids,  cc normal saline today to be given, possible fluid depletion  -   Hyroxychloroquin stopped due to mild QT prolongation  Solumedrol since 4/4    #EVELIA - likely 2/2 viral infection  - trend Cr,   - hold lasix/losartan    #Chronic Medical Problems:  - DM: ISS, f/u FS  - HTN: holding losartan and lasix in setting of EVELIA, c/w amlodipine  - Hypothyroidism: c/w synthroid  - Insomnia - c/w paroxetine, hydroxyzine PRN    #FEN/PPx  - heparin SubQ  - regular diet    #GOC – pt would not want to be intubated in case of clinical deterioration; discussed w/ family as well    I spoke with daughter and patient with phone     Full code    I spoke with ACP    I spoke with daughter with phone

## 2020-04-12 LAB
ALBUMIN SERPL ELPH-MCNC: 4 G/DL — SIGNIFICANT CHANGE UP (ref 3.3–5)
ALP SERPL-CCNC: 66 U/L — SIGNIFICANT CHANGE UP (ref 40–120)
ALT FLD-CCNC: 57 U/L — HIGH (ref 4–33)
ANION GAP SERPL CALC-SCNC: 16 MMO/L — HIGH (ref 7–14)
AST SERPL-CCNC: 29 U/L — SIGNIFICANT CHANGE UP (ref 4–32)
BASOPHILS # BLD AUTO: 0.01 K/UL — SIGNIFICANT CHANGE UP (ref 0–0.2)
BASOPHILS NFR BLD AUTO: 0.1 % — SIGNIFICANT CHANGE UP (ref 0–2)
BILIRUB SERPL-MCNC: 0.8 MG/DL — SIGNIFICANT CHANGE UP (ref 0.2–1.2)
BUN SERPL-MCNC: 58 MG/DL — HIGH (ref 7–23)
CALCIUM SERPL-MCNC: 11.2 MG/DL — HIGH (ref 8.4–10.5)
CHLORIDE SERPL-SCNC: 94 MMOL/L — LOW (ref 98–107)
CO2 SERPL-SCNC: 22 MMOL/L — SIGNIFICANT CHANGE UP (ref 22–31)
CREAT SERPL-MCNC: 1.56 MG/DL — HIGH (ref 0.5–1.3)
CRP SERPL-MCNC: 4 MG/L — SIGNIFICANT CHANGE UP
EOSINOPHIL # BLD AUTO: 0 K/UL — SIGNIFICANT CHANGE UP (ref 0–0.5)
EOSINOPHIL NFR BLD AUTO: 0 % — SIGNIFICANT CHANGE UP (ref 0–6)
FERRITIN SERPL-MCNC: 1150 NG/ML — HIGH (ref 15–150)
GLUCOSE BLDC GLUCOMTR-MCNC: 277 MG/DL — HIGH (ref 70–99)
GLUCOSE BLDC GLUCOMTR-MCNC: 309 MG/DL — HIGH (ref 70–99)
GLUCOSE BLDC GLUCOMTR-MCNC: 325 MG/DL — HIGH (ref 70–99)
GLUCOSE SERPL-MCNC: 311 MG/DL — HIGH (ref 70–99)
HCT VFR BLD CALC: 41.7 % — SIGNIFICANT CHANGE UP (ref 34.5–45)
HGB BLD-MCNC: 14.7 G/DL — SIGNIFICANT CHANGE UP (ref 11.5–15.5)
IMM GRANULOCYTES NFR BLD AUTO: 1.1 % — SIGNIFICANT CHANGE UP (ref 0–1.5)
LDH SERPL L TO P-CCNC: 243 U/L — HIGH (ref 135–225)
LYMPHOCYTES # BLD AUTO: 0.63 K/UL — LOW (ref 1–3.3)
LYMPHOCYTES # BLD AUTO: 6.9 % — LOW (ref 13–44)
MAGNESIUM SERPL-MCNC: 1.8 MG/DL — SIGNIFICANT CHANGE UP (ref 1.6–2.6)
MCHC RBC-ENTMCNC: 31.6 PG — SIGNIFICANT CHANGE UP (ref 27–34)
MCHC RBC-ENTMCNC: 35.3 % — SIGNIFICANT CHANGE UP (ref 32–36)
MCV RBC AUTO: 89.7 FL — SIGNIFICANT CHANGE UP (ref 80–100)
MONOCYTES # BLD AUTO: 0.52 K/UL — SIGNIFICANT CHANGE UP (ref 0–0.9)
MONOCYTES NFR BLD AUTO: 5.7 % — SIGNIFICANT CHANGE UP (ref 2–14)
NEUTROPHILS # BLD AUTO: 7.86 K/UL — HIGH (ref 1.8–7.4)
NEUTROPHILS NFR BLD AUTO: 86.2 % — HIGH (ref 43–77)
NRBC # FLD: 0 K/UL — SIGNIFICANT CHANGE UP (ref 0–0)
PHOSPHATE SERPL-MCNC: 2.8 MG/DL — SIGNIFICANT CHANGE UP (ref 2.5–4.5)
PLATELET # BLD AUTO: 467 K/UL — HIGH (ref 150–400)
PMV BLD: 9.8 FL — SIGNIFICANT CHANGE UP (ref 7–13)
POTASSIUM SERPL-MCNC: 3.7 MMOL/L — SIGNIFICANT CHANGE UP (ref 3.5–5.3)
POTASSIUM SERPL-SCNC: 3.7 MMOL/L — SIGNIFICANT CHANGE UP (ref 3.5–5.3)
PROT SERPL-MCNC: 8.1 G/DL — SIGNIFICANT CHANGE UP (ref 6–8.3)
RBC # BLD: 4.65 M/UL — SIGNIFICANT CHANGE UP (ref 3.8–5.2)
RBC # FLD: 11.8 % — SIGNIFICANT CHANGE UP (ref 10.3–14.5)
SODIUM SERPL-SCNC: 132 MMOL/L — LOW (ref 135–145)
WBC # BLD: 9.12 K/UL — SIGNIFICANT CHANGE UP (ref 3.8–10.5)
WBC # FLD AUTO: 9.12 K/UL — SIGNIFICANT CHANGE UP (ref 3.8–10.5)

## 2020-04-12 PROCEDURE — 99232 SBSQ HOSP IP/OBS MODERATE 35: CPT

## 2020-04-12 RX ADMIN — ZINC SULFATE TAB 220 MG (50 MG ZINC EQUIVALENT) 220 MILLIGRAM(S): 220 (50 ZN) TAB at 12:44

## 2020-04-12 RX ADMIN — Medication 25 MICROGRAM(S): at 04:56

## 2020-04-12 RX ADMIN — Medication 40 MILLIGRAM(S): at 17:20

## 2020-04-12 RX ADMIN — Medication 25 MILLIGRAM(S): at 04:56

## 2020-04-12 RX ADMIN — AMLODIPINE BESYLATE 5 MILLIGRAM(S): 2.5 TABLET ORAL at 04:56

## 2020-04-12 RX ADMIN — Medication 3: at 09:51

## 2020-04-12 RX ADMIN — Medication 20 MILLIGRAM(S): at 23:24

## 2020-04-12 RX ADMIN — Medication 40 MILLIGRAM(S): at 04:56

## 2020-04-12 RX ADMIN — Medication 4: at 13:34

## 2020-04-12 RX ADMIN — ENOXAPARIN SODIUM 40 MILLIGRAM(S): 100 INJECTION SUBCUTANEOUS at 12:44

## 2020-04-12 RX ADMIN — Medication 4: at 17:19

## 2020-04-12 NOTE — PROGRESS NOTE ADULT - SUBJECTIVE AND OBJECTIVE BOX
Subjective/Interval Events:    02 100 % on 4 L NC    P 50    RN tried to ambulate but 02 dropped into the mid 80's    No complaints, sitting , comfortable    i spoke with patient and son with phone     Hospital Medications:  acetaminophen   Tablet .. 650 milliGRAM(s) Oral every 4 hours PRN  acetaminophen   Tablet .. 650 milliGRAM(s) Oral once  amLODIPine   Tablet 5 milliGRAM(s) Oral daily  benzonatate 100 milliGRAM(s) Oral three times a day PRN  dextrose 40% Gel 15 Gram(s) Oral once PRN  dextrose 50% Injectable 12.5 Gram(s) IV Push once  dextrose 50% Injectable 25 Gram(s) IV Push once  dextrose 50% Injectable 25 Gram(s) IV Push once  enoxaparin Injectable 40 milliGRAM(s) SubCutaneous daily  glucagon  Injectable 1 milliGRAM(s) IntraMuscular once PRN  hydrochlorothiazide 25 milliGRAM(s) Oral daily  hydrOXYzine hydrochloride 25 milliGRAM(s) Oral at bedtime PRN  insulin lispro (HumaLOG) corrective regimen sliding scale   SubCutaneous three times a day before meals  levothyroxine 25 MICROGram(s) Oral daily  methylPREDNISolone sodium succinate Injectable 40 milliGRAM(s) IV Push two times a day  metoprolol succinate ER 25 milliGRAM(s) Oral daily  PARoxetine 20 milliGRAM(s) Oral at bedtime  zinc sulfate 220 milliGRAM(s) Oral daily      Physical Exam:  T(C): 36.7 (04-11-20 @ 16:41), Max: 36.7 (04-11-20 @ 16:41)  HR: 53 (04-12-20 @ 04:53) (53 - 80)  BP: 144/75 (04-12-20 @ 04:53) (138/72 - 144/75)  RR: 16 (04-12-20 @ 04:53) (16 - 16)  SpO2: 96% (04-12-20 @ 04:53) (94% - 96%)    Comfortable appearing  On nasal cannula/On NRB  No respiratory distress    Labs: reviewed                        13.4   10.07 )-----------( 420      ( 11 Apr 2020 07:45 )             37.3     04-11    133<L>  |  98  |  54<H>  ----------------------------<  269<H>  4.3   |  23  |  1.36<H>    Ca    10.5      11 Apr 2020 08:20  Phos  2.7     04-10  Mg     2.1     04-10    TPro  7.2  /  Alb  3.7  /  TBili  0.6  /  DBili  x   /  AST  30  /  ALT  48<H>  /  AlkPhos  54  04-11    LIVER FUNCTIONS - ( 11 Apr 2020 08:20 )  Alb: 3.7 g/dL / Pro: 7.2 g/dL / ALK PHOS: 54 u/L / ALT: 48 u/L / AST: 30 u/L / GGT: x               Diagnostic Studies: reviewed

## 2020-04-12 NOTE — PROGRESS NOTE ADULT - ASSESSMENT
Problem/Plan:  #Hypoxic respiratory failure 2/2 COVID-19 infection, pneumonia  - supplemental O2 via 4 l NC today, RN to try to ambulate on RA if 02 above 92%  - Tylenol PRN  - Albuterol PRN        Encourage PO fluids    RN to try ambulation again tomorrow on RA for possible d/c    encourage PO fluids  -   Hyroxychloroquin stopped due to mild QT prolongation  Solumedrol since 4/4    #EVELIA - likely 2/2 viral infection  - trend Cr,   - hold lasix/losartan    #Chronic Medical Problems:  - DM: ISS, f/u FS  - HTN: holding losartan and lasix in setting of EVELIA, c/w amlodipine  - Hypothyroidism: c/w synthroid  - Insomnia - c/w paroxetine, hydroxyzine PRN    #FEN/PPx  - heparin SubQ  - regular diet    #GOC – pt would not want to be intubated in case of clinical deterioration; discussed w/ family as well    I spoke with daughter and son and patient with phone     Full code    I spoke with ACP    I spoke with daughter with phone

## 2020-04-13 LAB
ALBUMIN SERPL ELPH-MCNC: 3.7 G/DL — SIGNIFICANT CHANGE UP (ref 3.3–5)
ALP SERPL-CCNC: 61 U/L — SIGNIFICANT CHANGE UP (ref 40–120)
ALT FLD-CCNC: 50 U/L — HIGH (ref 4–33)
ANION GAP SERPL CALC-SCNC: 12 MMO/L — SIGNIFICANT CHANGE UP (ref 7–14)
AST SERPL-CCNC: 20 U/L — SIGNIFICANT CHANGE UP (ref 4–32)
BILIRUB SERPL-MCNC: 0.8 MG/DL — SIGNIFICANT CHANGE UP (ref 0.2–1.2)
BUN SERPL-MCNC: 53 MG/DL — HIGH (ref 7–23)
CALCIUM SERPL-MCNC: 11 MG/DL — HIGH (ref 8.4–10.5)
CHLORIDE SERPL-SCNC: 94 MMOL/L — LOW (ref 98–107)
CO2 SERPL-SCNC: 24 MMOL/L — SIGNIFICANT CHANGE UP (ref 22–31)
CREAT SERPL-MCNC: 1.43 MG/DL — HIGH (ref 0.5–1.3)
GLUCOSE BLDC GLUCOMTR-MCNC: 286 MG/DL — HIGH (ref 70–99)
GLUCOSE SERPL-MCNC: 312 MG/DL — HIGH (ref 70–99)
HCT VFR BLD CALC: 41.3 % — SIGNIFICANT CHANGE UP (ref 34.5–45)
HGB BLD-MCNC: 14.8 G/DL — SIGNIFICANT CHANGE UP (ref 11.5–15.5)
MCHC RBC-ENTMCNC: 31.9 PG — SIGNIFICANT CHANGE UP (ref 27–34)
MCHC RBC-ENTMCNC: 35.8 % — SIGNIFICANT CHANGE UP (ref 32–36)
MCV RBC AUTO: 89 FL — SIGNIFICANT CHANGE UP (ref 80–100)
NRBC # FLD: 0 K/UL — SIGNIFICANT CHANGE UP (ref 0–0)
PLATELET # BLD AUTO: 371 K/UL — SIGNIFICANT CHANGE UP (ref 150–400)
PMV BLD: 10 FL — SIGNIFICANT CHANGE UP (ref 7–13)
POTASSIUM SERPL-MCNC: 4.2 MMOL/L — SIGNIFICANT CHANGE UP (ref 3.5–5.3)
POTASSIUM SERPL-SCNC: 4.2 MMOL/L — SIGNIFICANT CHANGE UP (ref 3.5–5.3)
PROT SERPL-MCNC: 7.6 G/DL — SIGNIFICANT CHANGE UP (ref 6–8.3)
RBC # BLD: 4.64 M/UL — SIGNIFICANT CHANGE UP (ref 3.8–5.2)
RBC # FLD: 11.7 % — SIGNIFICANT CHANGE UP (ref 10.3–14.5)
SODIUM SERPL-SCNC: 130 MMOL/L — LOW (ref 135–145)
WBC # BLD: 9.23 K/UL — SIGNIFICANT CHANGE UP (ref 3.8–10.5)
WBC # FLD AUTO: 9.23 K/UL — SIGNIFICANT CHANGE UP (ref 3.8–10.5)

## 2020-04-13 RX ORDER — INSULIN LISPRO 100/ML
VIAL (ML) SUBCUTANEOUS AT BEDTIME
Refills: 0 | Status: DISCONTINUED | OUTPATIENT
Start: 2020-04-13 | End: 2020-04-13

## 2020-04-13 RX ORDER — INSULIN LISPRO 100/ML
8 VIAL (ML) SUBCUTANEOUS ONCE
Refills: 0 | Status: COMPLETED | OUTPATIENT
Start: 2020-04-13 | End: 2020-04-13

## 2020-04-13 RX ORDER — INSULIN LISPRO 100/ML
VIAL (ML) SUBCUTANEOUS
Refills: 0 | Status: DISCONTINUED | OUTPATIENT
Start: 2020-04-13 | End: 2020-04-14

## 2020-04-13 RX ORDER — INSULIN LISPRO 100/ML
VIAL (ML) SUBCUTANEOUS AT BEDTIME
Refills: 0 | Status: DISCONTINUED | OUTPATIENT
Start: 2020-04-13 | End: 2020-04-14

## 2020-04-13 RX ADMIN — Medication 8 UNIT(S): at 15:22

## 2020-04-13 RX ADMIN — Medication 3: at 09:16

## 2020-04-13 RX ADMIN — ZINC SULFATE TAB 220 MG (50 MG ZINC EQUIVALENT) 220 MILLIGRAM(S): 220 (50 ZN) TAB at 13:09

## 2020-04-13 RX ADMIN — Medication 25 MILLIGRAM(S): at 05:12

## 2020-04-13 RX ADMIN — Medication 5: at 13:09

## 2020-04-13 RX ADMIN — ENOXAPARIN SODIUM 40 MILLIGRAM(S): 100 INJECTION SUBCUTANEOUS at 13:09

## 2020-04-13 RX ADMIN — AMLODIPINE BESYLATE 5 MILLIGRAM(S): 2.5 TABLET ORAL at 05:13

## 2020-04-13 RX ADMIN — Medication 4: at 17:24

## 2020-04-13 RX ADMIN — Medication 40 MILLIGRAM(S): at 05:12

## 2020-04-13 RX ADMIN — Medication 25 MICROGRAM(S): at 05:13

## 2020-04-13 NOTE — DISCHARGE NOTE PROVIDER - NSDCMRMEDTOKEN_GEN_ALL_CORE_FT
amLODIPine 5 mg oral tablet: 1 tab(s) orally once a day  furosemide 40 mg oral tablet: 1 tab(s) orally once a day  glipiZIDE 2.5 mg oral tablet, extended release: 1 tab(s) orally once a day  hydroCHLOROthiazide 25 mg oral tablet: 1 tab(s) orally once a day  losartan 100 mg oral tablet: 1 tab(s) orally once a day  metoprolol succinate 25 mg oral capsule, extended release: 1 cap(s) orally once a day  Synthroid 25 mcg (0.025 mg) oral tablet: 1 tab(s) orally once a day acetaminophen 325 mg oral tablet: 2 tab(s) orally every 4 hours, As needed, Temp greater or equal to 38.5C (101.3F)  amLODIPine 5 mg oral tablet: 1 tab(s) orally once a day  furosemide 40 mg oral tablet: 1 tab(s) orally once a day  glipiZIDE 2.5 mg oral tablet, extended release: 1 tab(s) orally once a day  hydroCHLOROthiazide 25 mg oral tablet: 1 tab(s) orally once a day  metoprolol succinate 25 mg oral capsule, extended release: 1 cap(s) orally once a day  Synthroid 25 mcg (0.025 mg) oral tablet: 1 tab(s) orally once a day acetaminophen 325 mg oral tablet: 2 tab(s) orally every 4 hours, As needed, Temp greater or equal to 38.5C (101.3F)  amLODIPine 5 mg oral tablet: 1 tab(s) orally once a day  glipiZIDE 2.5 mg oral tablet, extended release: 1 tab(s) orally once a day  hydroCHLOROthiazide 25 mg oral tablet: 1 tab(s) orally once a day  metoprolol succinate 25 mg oral capsule, extended release: 1 cap(s) orally once a day  Synthroid 25 mcg (0.025 mg) oral tablet: 1 tab(s) orally once a day

## 2020-04-13 NOTE — DISCHARGE NOTE PROVIDER - NSDCCPCAREPLAN_GEN_ALL_CORE_FT
PRINCIPAL DISCHARGE DIAGNOSIS  Diagnosis: COVID-19  Assessment and Plan of Treatment: You have been diagnosed with the COVID-19 virus during your hospital stay. You must self quarantine to complete a 14 day time period.  Monitor for fevers, shortness of breath and cough primarily.  Monitor your temperature daily to not any changes and increases.    It has been determined that you no longer need hospitalization and can recover while remaining in self-quarantine at home. You should follow the prevention steps below until a healthcare provider or local or state health department says you can return to your normal activities.  1. You should restrict activities outside your home, except for getting medical care.  2. Do not go to work, school, or public areas.  3. Avoid using public transportation, ride-sharing, or taxis.  4. Separate yourself from other people and animals in your home.  5. Call ahead before visiting your doctor.  6. Wear a facemask.  7. Cover your coughs and sneezes.  8. Clean your hands often.  9. Avoid sharing personal household items.  10. Clean all “high-touch” surfaces everyday.  11. Monitor your symptoms.  If you have a medical emergency and need to call 911, notify the dispatch personnel that you have COVID-19 If possible, put on a facemask before emergency medical services arrive.  12. Stopping home isolation.  Patients with confirmed COVID-19 should remain under home isolation precautions for 14 days since the positive COVID-19 test and until the risk of secondary transmission to others is thought to be low. The decision to discontinue home isolation precautions should be made on a case-by-case basis, in consultation with healthcare providers and state and local health departments. Your [facility] Department of Health can be reached at [phone] for further information about COVID-19. PRINCIPAL DISCHARGE DIAGNOSIS  Diagnosis: COVID-19  Assessment and Plan of Treatment: You have been diagnosed with the COVID-19 virus during your hospital stay. You must self quarantine to complete a 14 day time period.  Monitor for fevers, shortness of breath and cough primarily.  Monitor your temperature daily to not any changes and increases.    It has been determined that you no longer need hospitalization and can recover while remaining in self-quarantine at home. You should follow the prevention steps below until a healthcare provider or local or state health department says you can return to your normal activities.  1. You should restrict activities outside your home, except for getting medical care.  2. Do not go to work, school, or public areas.  3. Avoid using public transportation, ride-sharing, or taxis.  4. Separate yourself from other people and animals in your home.  5. Call ahead before visiting your doctor.  6. Wear a facemask.  7. Cover your coughs and sneezes.  8. Clean your hands often.  9. Avoid sharing personal household items.  10. Clean all “high-touch” surfaces everyday.  11. Monitor your symptoms.  If you have a medical emergency and need to call 911, notify the dispatch personnel that you have COVID-19 If possible, put on a facemask before emergency medical services arrive.  12. Stopping home isolation.  Patients with confirmed COVID-19 should remain under home isolation precautions for 14 days since the positive COVID-19 test and until the risk of secondary transmission to others is thought to be low. The decision to discontinue home isolation precautions should be made on a case-by-case basis, in consultation with healthcare providers and state and local health departments. Your [facility] Department of Health can be reached at [phone] for further information about COVID-19.      SECONDARY DISCHARGE DIAGNOSES  Diagnosis: Essential (primary) hypertension  Assessment and Plan of Treatment: Your losartan and lasix was discontinued.  Monitor for any visual changes, headaches or dizziness.  Monitor blood pressure regularly.  Follow up with your PCP for further management for high blood pressure, please call to make appointment within 1 week of discharge    Diagnosis: Hypothyroidism, unspecified type  Assessment and Plan of Treatment: Continue synthroid.    Diagnosis: Type 2 diabetes mellitus without complication, without long-term current use of insulin  Assessment and Plan of Treatment: Your a1c was 7.7. Continue consistent carbohydrate diet.  Monitor blood glucose levels throughout the day before meals and at bedtime.  Record blood sugars and bring to outpatient providers appointment in order to be reviewed by your doctor for management modifications.  Be aware of diabetes management symptoms including feeling cool and clammy may be related to low glucose levels.  Feeling hot and dry may indicate high glucose levels.  If  you feel these symptoms, check your blood sugar.  Make regular podiatry appointments in order to have feet checked for wounds and toe nails cut by a doctor to prevent infections. PRINCIPAL DISCHARGE DIAGNOSIS  Diagnosis: COVID-19  Assessment and Plan of Treatment: You have been diagnosed with the COVID-19 virus during your hospital stay. You must self quarantine to complete a 14 day time period.  Monitor for fevers, shortness of breath and cough primarily.  Monitor your temperature daily to not any changes and increases.    It has been determined that you no longer need hospitalization and can recover while remaining in self-quarantine at home. You should follow the prevention steps below until a healthcare provider or local or state health department says you can return to your normal activities.  1. You should restrict activities outside your home, except for getting medical care.  2. Do not go to work, school, or public areas.  3. Avoid using public transportation, ride-sharing, or taxis.  4. Separate yourself from other people and animals in your home.  5. Call ahead before visiting your doctor.  6. Wear a facemask.  7. Cover your coughs and sneezes.  8. Clean your hands often.  9. Avoid sharing personal household items.  10. Clean all “high-touch” surfaces everyday.  11. Monitor your symptoms.  If you have a medical emergency and need to call 911, notify the dispatch personnel that you have COVID-19 If possible, put on a facemask before emergency medical services arrive.  12. Stopping home isolation.  Patients with confirmed COVID-19 should remain under home isolation precautions for 14 days since the positive COVID-19 test and until the risk of secondary transmission to others is thought to be low. The decision to discontinue home isolation precautions should be made on a case-by-case basis, in consultation with healthcare providers and state and local health departments. Your [facility] Department of Health can be reached at [phone] for further information about COVID-19.      SECONDARY DISCHARGE DIAGNOSES  Diagnosis: Essential (primary) hypertension  Assessment and Plan of Treatment: Your losartan and lasix was discontinued for some EVELIA (your blood pressure was contolled off of these medications).  Monitor for any visual changes, headaches or dizziness.  Monitor blood pressure regularly.  Follow up with your PCP for further management for high blood pressure, please call to make appointment within 1 week of discharge    Diagnosis: Hypothyroidism, unspecified type  Assessment and Plan of Treatment: Continue synthroid.    Diagnosis: Type 2 diabetes mellitus without complication, without long-term current use of insulin  Assessment and Plan of Treatment: Your a1c was 7.7. Continue consistent carbohydrate diet.  Monitor blood glucose levels throughout the day before meals and at bedtime.  Record blood sugars and bring to outpatient providers appointment in order to be reviewed by your doctor for management modifications.  Be aware of diabetes management symptoms including feeling cool and clammy may be related to low glucose levels.  Feeling hot and dry may indicate high glucose levels.  If  you feel these symptoms, check your blood sugar.  Make regular podiatry appointments in order to have feet checked for wounds and toe nails cut by a doctor to prevent infections.

## 2020-04-13 NOTE — DISCHARGE NOTE PROVIDER - INSTRUCTIONS
Regular consistency  Consistent carbohydrate (diabetic diet) Regular consistency  Consistent carbohydrate (diabetic diet)  DASH/TLC (sodium and cholesterol restricted)

## 2020-04-13 NOTE — DISCHARGE NOTE PROVIDER - HOSPITAL COURSE
Pt was admitted to the hospital and was found to have COVID 19. Pt was treated with plaquenil & steroids. Pt was also managed supportively with oxygen supplementation. Pt was weaned off supplemental oxygen and was saturating well on room air prior to discharge.         Pt medically stable for discharge on ------, as per discussion with -----. Pt was admitted to the hospital and was found to have COVID 19. Pt was treated with plaquenil & steroids. Pt was also managed supportively with oxygen supplementation. Pt was weaned off supplemental oxygen and was saturating well on room air prior to discharge.         Pt medically stable for discharge on 4/14/2020, as per discussion with Dr. Biswas. 74 year old female with DM type 2, HTN, hypothyroidism, CVA presenting with acute hypoxic respiratory failure/distress in the setting of SARS-CoV-2/COVID-19. She was treated with a 5 day course of  hydroxychloroquine as well as a prolonged 8 day course of IV steroids. She was weaned off of supplemental oxygen maintaining SpO2 >90% at rest and ambulation. Sugars were controlled using insulin while on IV steroids, and resumed on home regimen upon discharge with plans for outpatient follow up. Medically stable for discharge on 4/14/2020.

## 2020-04-13 NOTE — PROGRESS NOTE ADULT - SUBJECTIVE AND OBJECTIVE BOX
New attending of service. Full progress note to follow upon chart review and evaluation.     True Biswas MD, MPH, SENA, RPVI, FACC  Cardiovascular Specialist   [facility]  c: [phone] (text or call)  e: [email or web address] SUBJ:  No acute events overnight.     Home Medications:  amLODIPine 5 mg oral tablet: 1 tab(s) orally once a day (04 Apr 2020 05:34)  furosemide 40 mg oral tablet: 1 tab(s) orally once a day (04 Apr 2020 05:34)  glipiZIDE 2.5 mg oral tablet, extended release: 1 tab(s) orally once a day (04 Apr 2020 05:34)  hydroCHLOROthiazide 25 mg oral tablet: 1 tab(s) orally once a day (04 Apr 2020 05:34)  losartan 100 mg oral tablet: 1 tab(s) orally once a day (04 Apr 2020 05:34)  metoprolol succinate 25 mg oral capsule, extended release: 1 cap(s) orally once a day (04 Apr 2020 05:34)  Synthroid 25 mcg (0.025 mg) oral tablet: 1 tab(s) orally once a day (04 Apr 2020 05:34)    MEDICATIONS  (STANDING):  acetaminophen   Tablet .. 650 milliGRAM(s) Oral once  amLODIPine   Tablet 5 milliGRAM(s) Oral daily  dextrose 50% Injectable 12.5 Gram(s) IV Push once  dextrose 50% Injectable 25 Gram(s) IV Push once  dextrose 50% Injectable 25 Gram(s) IV Push once  enoxaparin Injectable 40 milliGRAM(s) SubCutaneous daily  hydrochlorothiazide 25 milliGRAM(s) Oral daily  insulin lispro (HumaLOG) corrective regimen sliding scale   SubCutaneous three times a day before meals  insulin lispro (HumaLOG) corrective regimen sliding scale   SubCutaneous at bedtime  levothyroxine 25 MICROGram(s) Oral daily  metoprolol succinate ER 25 milliGRAM(s) Oral daily  PARoxetine 20 milliGRAM(s) Oral at bedtime  zinc sulfate 220 milliGRAM(s) Oral daily    MEDICATIONS  (PRN):  acetaminophen   Tablet .. 650 milliGRAM(s) Oral every 4 hours PRN Temp greater or equal to 38.5C (101.3F)  benzonatate 100 milliGRAM(s) Oral three times a day PRN Cough  dextrose 40% Gel 15 Gram(s) Oral once PRN Blood Glucose LESS THAN 70 milliGRAM(s)/deciliter  glucagon  Injectable 1 milliGRAM(s) IntraMuscular once PRN Glucose LESS THAN 70 milligrams/deciliter  hydrOXYzine hydrochloride 25 milliGRAM(s) Oral at bedtime PRN Agitation    Vital Signs Last 24 Hrs  T(C): 36.7 (13 Apr 2020 23:11), Max: 36.7 (13 Apr 2020 12:40)  T(F): 98 (13 Apr 2020 23:11), Max: 98 (13 Apr 2020 12:40)  HR: 60 (13 Apr 2020 23:11) (56 - 64)  BP: 129/80 (13 Apr 2020 23:11) (128/80 - 132/76)  BP(mean): --  RR: 18 (13 Apr 2020 23:11) (18 - 19)  SpO2: 99% (13 Apr 2020 23:11) (97% - 99%)    REVIEW OF SYSTEMS:  As per HPI, otherwise unremarkable.     PHYSICAL EXAM:  Exam deferred; please refer to prior physician evaluation and physical exam to minimize my direct exposure to novel COVID-19 virus given no/minimal impact to overall assessment and plan based on my clinical judgement.     LABS:  CBC Full  -  ( 13 Apr 2020 09:17 )  WBC Count : 9.23 K/uL  RBC Count : 4.64 M/uL  Hemoglobin : 14.8 g/dL  Hematocrit : 41.3 %  Platelet Count - Automated : 371 K/uL  Mean Cell Volume : 89.0 fL  Mean Cell Hemoglobin : 31.9 pg  Mean Cell Hemoglobin Concentration : 35.8 %    04-13    130<L>  |  94<L>  |  53<H>  ----------------------------<  312<H>  4.2   |  24  |  1.43<H>    Ca    11.0<H>      13 Apr 2020 09:17  Phos  2.8     04-12  Mg     1.8     04-12    TPro  7.6  /  Alb  3.7  /  TBili  0.8  /  DBili  x   /  AST  20  /  ALT  50<H>  /  AlkPhos  61  04-13    COVID-19 PCR: Detected (03 Apr 2020 18:13)    IMPRESSION AND PLAN:  74F with DM type 2, HTN, hypothyroidism, CVA presenting with acute hypoxic respiratory failure/distress in the setting of SARS-CoV-2/COVID-19.    1) JAIMEE CoV-19 viral infection causing COVID-19 disease with wide ranging phenotype.  Cytokine storm.  Acute hypoxic respiratory failure/distress requiring NC with ambulation to maintain saturations SpO2 >92%. Saturating well on RA at rest.   Inflammatory markers including CRP; minimally elevated.   Imaging: CXR Patchy/hazy bilateral opacities. Pattern suggests infection including atypical pneumonia/viral infection from atypical agents including COVID-19.    ·	Completed hydroxychloroquine. (complete 4/5-9)  ·	Completed course of IV methylprednisolone 40 mg BID. (4/5-13)  ·	Continue anti-pyretic regimen of acetaminophen 650 mg q6h prn for temperature >100.4F.   ·	Continue supportive care with supplemental oxygenation to maintain a minimum of SpO2 >92% and trial down-titration when SpO2 >92%.   ·	Consider prone postioning if unable to reach minimum target SpO2.   ·	DNI; Goals of care discussion in the setting of critical care limited resource allocation protocols.   ·	For mild COVID-19 symptoms recommend prophylactic enoxaparin 40 mg daily or BID if BMI >30.     2) HTN   Well controlled.     ·	Continue medical management with HCTZ 25 mg daily, amlodipine 5 mg daily, and metoprolol succinate 25 mg daily.   ·	Holding losartan 100 mg daily given normal blood pressures.     3) Hypothyroid     ·	Continue medical management with levothyroxine 25 mcg daily.     Discharge once able to maintain oxygen saturation SpO2 >90% ar rest and with minimal exertion.     True Biswas MD, MPH, SENA, RPVI, FACC  Cardiovascular Specialist Attending  [facility]  C: [phone]   E: [email or web address] SUBJ:  No acute events overnight.     Home Medications:  amLODIPine 5 mg oral tablet: 1 tab(s) orally once a day (04 Apr 2020 05:34)  furosemide 40 mg oral tablet: 1 tab(s) orally once a day (04 Apr 2020 05:34)  glipiZIDE 2.5 mg oral tablet, extended release: 1 tab(s) orally once a day (04 Apr 2020 05:34)  hydroCHLOROthiazide 25 mg oral tablet: 1 tab(s) orally once a day (04 Apr 2020 05:34)  losartan 100 mg oral tablet: 1 tab(s) orally once a day (04 Apr 2020 05:34)  metoprolol succinate 25 mg oral capsule, extended release: 1 cap(s) orally once a day (04 Apr 2020 05:34)  Synthroid 25 mcg (0.025 mg) oral tablet: 1 tab(s) orally once a day (04 Apr 2020 05:34)    MEDICATIONS  (STANDING):  acetaminophen   Tablet .. 650 milliGRAM(s) Oral once  amLODIPine   Tablet 5 milliGRAM(s) Oral daily  dextrose 50% Injectable 12.5 Gram(s) IV Push once  dextrose 50% Injectable 25 Gram(s) IV Push once  dextrose 50% Injectable 25 Gram(s) IV Push once  enoxaparin Injectable 40 milliGRAM(s) SubCutaneous daily  hydrochlorothiazide 25 milliGRAM(s) Oral daily  insulin lispro (HumaLOG) corrective regimen sliding scale   SubCutaneous three times a day before meals  insulin lispro (HumaLOG) corrective regimen sliding scale   SubCutaneous at bedtime  levothyroxine 25 MICROGram(s) Oral daily  metoprolol succinate ER 25 milliGRAM(s) Oral daily  PARoxetine 20 milliGRAM(s) Oral at bedtime  zinc sulfate 220 milliGRAM(s) Oral daily    MEDICATIONS  (PRN):  acetaminophen   Tablet .. 650 milliGRAM(s) Oral every 4 hours PRN Temp greater or equal to 38.5C (101.3F)  benzonatate 100 milliGRAM(s) Oral three times a day PRN Cough  dextrose 40% Gel 15 Gram(s) Oral once PRN Blood Glucose LESS THAN 70 milliGRAM(s)/deciliter  glucagon  Injectable 1 milliGRAM(s) IntraMuscular once PRN Glucose LESS THAN 70 milligrams/deciliter  hydrOXYzine hydrochloride 25 milliGRAM(s) Oral at bedtime PRN Agitation    Vital Signs Last 24 Hrs  T(C): 36.7 (13 Apr 2020 23:11), Max: 36.7 (13 Apr 2020 12:40)  T(F): 98 (13 Apr 2020 23:11), Max: 98 (13 Apr 2020 12:40)  HR: 60 (13 Apr 2020 23:11) (56 - 64)  BP: 129/80 (13 Apr 2020 23:11) (128/80 - 132/76)  BP(mean): --  RR: 18 (13 Apr 2020 23:11) (18 - 19)  SpO2: 99% (13 Apr 2020 23:11) (97% - 99%)    REVIEW OF SYSTEMS:  As per HPI, otherwise unremarkable.     PHYSICAL EXAM:  Exam deferred; please refer to prior physician evaluation and physical exam to minimize my direct exposure to novel COVID-19 virus given no/minimal impact to overall assessment and plan based on my clinical judgement.     LABS:  CBC Full  -  ( 13 Apr 2020 09:17 )  WBC Count : 9.23 K/uL  RBC Count : 4.64 M/uL  Hemoglobin : 14.8 g/dL  Hematocrit : 41.3 %  Platelet Count - Automated : 371 K/uL  Mean Cell Volume : 89.0 fL  Mean Cell Hemoglobin : 31.9 pg  Mean Cell Hemoglobin Concentration : 35.8 %    04-13    130<L>  |  94<L>  |  53<H>  ----------------------------<  312<H>  4.2   |  24  |  1.43<H>    Ca    11.0<H>      13 Apr 2020 09:17  Phos  2.8     04-12  Mg     1.8     04-12    TPro  7.6  /  Alb  3.7  /  TBili  0.8  /  DBili  x   /  AST  20  /  ALT  50<H>  /  AlkPhos  61  04-13    COVID-19 PCR: Detected (03 Apr 2020 18:13)    IMPRESSION AND PLAN:  74F with DM type 2, HTN, hypothyroidism, CVA presenting with acute hypoxic respiratory failure/distress in the setting of SARS-CoV-2/COVID-19.    1) JAIMEE CoV-19 viral infection causing COVID-19 disease with wide ranging phenotype.  Cytokine storm.  Acute hypoxic respiratory failure/distress requiring NC with ambulation to maintain saturations SpO2 >92%. Saturating well on RA at rest.   Inflammatory markers including CRP; minimally elevated.   Imaging: CXR Patchy/hazy bilateral opacities. Pattern suggests infection including atypical pneumonia/viral infection from atypical agents including COVID-19.    ·	Completed hydroxychloroquine. (complete 4/5-9)  ·	Completed course of IV methylprednisolone 40 mg BID. (4/5-13)  ·	Continue anti-pyretic regimen of acetaminophen 650 mg q6h prn for temperature >100.4F.   ·	Continue supportive care with supplemental oxygenation to maintain a minimum of SpO2 >92% and trial down-titration when SpO2 >92%.   ·	Consider prone postioning if unable to reach minimum target SpO2.   ·	DNI; Goals of care discussion in the setting of critical care limited resource allocation protocols.   ·	For mild COVID-19 symptoms recommend prophylactic enoxaparin 40 mg daily or BID if BMI >30.     2) HTN   Well controlled.     ·	Continue medical management with HCTZ 25 mg daily, amlodipine 5 mg daily, and metoprolol succinate 25 mg daily.   ·	Holding losartan 100 mg daily given normal blood pressures.     3) Hypothyroid     ·	Continue medical management with levothyroxine 25 mcg daily.     4) DM   Diabetes uncontrolled.   exacerbated by IV steroids     ·	Increase basal insulin and moderate intensity sliding scale.   ·	Resume home medications once steroid discontinued     Discharge once able to maintain oxygen saturation SpO2 >90% ar rest and with minimal exertion.     True Biswas MD, MPH, SENA, RPVI, FACC  Cardiovascular Specialist Attending  [facility]  C: [phone]   E: [email or web address]

## 2020-04-13 NOTE — DISCHARGE NOTE PROVIDER - NSDCFUADDAPPT_GEN_ALL_CORE_FT
Continue all previous home medications except for losartan and lasix.   Follow up with PCP within 2 weeks of discharge.

## 2020-04-14 VITALS — WEIGHT: 175.49 LBS

## 2020-04-14 LAB
ANION GAP SERPL CALC-SCNC: 15 MMO/L — HIGH (ref 7–14)
BUN SERPL-MCNC: 57 MG/DL — HIGH (ref 7–23)
CALCIUM SERPL-MCNC: 11.2 MG/DL — HIGH (ref 8.4–10.5)
CHLORIDE SERPL-SCNC: 98 MMOL/L — SIGNIFICANT CHANGE UP (ref 98–107)
CO2 SERPL-SCNC: 22 MMOL/L — SIGNIFICANT CHANGE UP (ref 22–31)
CREAT SERPL-MCNC: 1.64 MG/DL — HIGH (ref 0.5–1.3)
GLUCOSE SERPL-MCNC: 152 MG/DL — HIGH (ref 70–99)
HBA1C BLD-MCNC: 7.7 % — HIGH (ref 4–5.6)
POTASSIUM SERPL-MCNC: 3.6 MMOL/L — SIGNIFICANT CHANGE UP (ref 3.5–5.3)
POTASSIUM SERPL-SCNC: 3.6 MMOL/L — SIGNIFICANT CHANGE UP (ref 3.5–5.3)
SODIUM SERPL-SCNC: 135 MMOL/L — SIGNIFICANT CHANGE UP (ref 135–145)

## 2020-04-14 RX ORDER — FUROSEMIDE 40 MG
1 TABLET ORAL
Qty: 0 | Refills: 0 | DISCHARGE

## 2020-04-14 RX ORDER — ACETAMINOPHEN 500 MG
2 TABLET ORAL
Qty: 0 | Refills: 0 | DISCHARGE
Start: 2020-04-14

## 2020-04-14 RX ADMIN — Medication 25 MILLIGRAM(S): at 05:02

## 2020-04-14 RX ADMIN — AMLODIPINE BESYLATE 5 MILLIGRAM(S): 2.5 TABLET ORAL at 05:02

## 2020-04-14 RX ADMIN — Medication 2: at 08:58

## 2020-04-14 RX ADMIN — ENOXAPARIN SODIUM 40 MILLIGRAM(S): 100 INJECTION SUBCUTANEOUS at 12:40

## 2020-04-14 RX ADMIN — Medication 25 MICROGRAM(S): at 05:03

## 2020-04-14 RX ADMIN — Medication 4: at 12:48

## 2020-04-14 RX ADMIN — ZINC SULFATE TAB 220 MG (50 MG ZINC EQUIVALENT) 220 MILLIGRAM(S): 220 (50 ZN) TAB at 12:41

## 2020-04-14 NOTE — DIETITIAN INITIAL EVALUATION ADULT. - DIET TYPE
PO diet consistent carbohydrate (no snacks)/DASH/TLC (sodium and cholesterol restricted diet)/regular

## 2020-04-14 NOTE — DIETITIAN INITIAL EVALUATION ADULT. - ADD RECOMMEND
1. Monitor PO diet tolerance; Honor food preferences;     2. Obtain Pt's height & current weights;      3. Monitor labs, weekly weights, hydration status;

## 2020-04-14 NOTE — DISCHARGE NOTE NURSING/CASE MANAGEMENT/SOCIAL WORK - PATIENT PORTAL LINK FT
You can access the FollowMyHealth Patient Portal offered by [facility] by registering at the following website: [email or web address]. By joining [employer]’s FollowMyHealth portal, you will also be able to view your health information using other applications (apps) compatible with our system.

## 2020-04-14 NOTE — PROGRESS NOTE ADULT - REASON FOR ADMISSION
SOB, fever

## 2020-04-14 NOTE — DIETITIAN INITIAL EVALUATION ADULT. - OTHER INFO
Pt 75 yo female with PMHx of DM, hypothyroidism, CVA, depression, OA, presented with fever, chills, cough, SOB; Pt COVID (+) - per chart review. Unable to conduct a face to face interview or nutrition-focused physical exam due to limited contact restrictions related to Pt's medical condition and isolation precautions. Collateral obtained from chart review. Unable to assess PO intake. No GI distress (nausea/vomiting/diarrhea/constipation) per chart. No chewing or swallowing difficulties at this time per chart. Unable to assess diet or weight history @ present. Of note Pt's HbA1c level 7.7% (4/14). Unable to discuss Consistent Carbohydrate diet at present. Will recommend to follow up with appropriate RDN upon discharge for the purposes of long-term nutrition evaluation and diet education.

## 2020-04-14 NOTE — PROGRESS NOTE ADULT - SUBJECTIVE AND OBJECTIVE BOX
SUBJ:  No acute events overnight. Weaned off oxygen and saturating well on RA. Discharge planning.     Home Medications:  acetaminophen 325 mg oral tablet: 2 tab(s) orally every 4 hours, As needed, Temp greater or equal to 38.5C (101.3F) (14 Apr 2020 15:01)  amLODIPine 5 mg oral tablet: 1 tab(s) orally once a day (04 Apr 2020 05:34)  glipiZIDE 2.5 mg oral tablet, extended release: 1 tab(s) orally once a day (04 Apr 2020 05:34)  hydroCHLOROthiazide 25 mg oral tablet: 1 tab(s) orally once a day (04 Apr 2020 05:34)  metoprolol succinate 25 mg oral capsule, extended release: 1 cap(s) orally once a day (04 Apr 2020 05:34)  Synthroid 25 mcg (0.025 mg) oral tablet: 1 tab(s) orally once a day (04 Apr 2020 05:34)    MEDICATIONS  (STANDING):  acetaminophen   Tablet .. 650 milliGRAM(s) Oral once  amLODIPine   Tablet 5 milliGRAM(s) Oral daily  dextrose 50% Injectable 12.5 Gram(s) IV Push once  dextrose 50% Injectable 25 Gram(s) IV Push once  dextrose 50% Injectable 25 Gram(s) IV Push once  enoxaparin Injectable 40 milliGRAM(s) SubCutaneous daily  hydrochlorothiazide 25 milliGRAM(s) Oral daily  insulin lispro (HumaLOG) corrective regimen sliding scale   SubCutaneous three times a day before meals  insulin lispro (HumaLOG) corrective regimen sliding scale   SubCutaneous at bedtime  levothyroxine 25 MICROGram(s) Oral daily  metoprolol succinate ER 25 milliGRAM(s) Oral daily  PARoxetine 20 milliGRAM(s) Oral at bedtime  zinc sulfate 220 milliGRAM(s) Oral daily    MEDICATIONS  (PRN):  acetaminophen   Tablet .. 650 milliGRAM(s) Oral every 4 hours PRN Temp greater or equal to 38.5C (101.3F)  benzonatate 100 milliGRAM(s) Oral three times a day PRN Cough  dextrose 40% Gel 15 Gram(s) Oral once PRN Blood Glucose LESS THAN 70 milliGRAM(s)/deciliter  glucagon  Injectable 1 milliGRAM(s) IntraMuscular once PRN Glucose LESS THAN 70 milligrams/deciliter  hydrOXYzine hydrochloride 25 milliGRAM(s) Oral at bedtime PRN Agitation    Vital Signs Last 24 Hrs  T(C): 36.4 (14 Apr 2020 12:12), Max: 36.7 (13 Apr 2020 23:11)  T(F): 97.6 (14 Apr 2020 12:12), Max: 98 (13 Apr 2020 23:11)  HR: 68 (14 Apr 2020 12:12) (59 - 68)  BP: 117/78 (14 Apr 2020 12:12) (117/78 - 135/83)  RR: 18 (14 Apr 2020 12:12) (16 - 18)  SpO2: 95% (14 Apr 2020 12:12) (95% - 99%)    REVIEW OF SYSTEMS:  As per HPI, otherwise unremarkable.     PHYSICAL EXAM:  Exam deferred; please refer to prior physician evaluation and physical exam to minimize my direct exposure to novel COVID-19 virus given no/minimal impact to overall assessment and plan based on my clinical judgement.     LABS:  CBC Full  -  ( 13 Apr 2020 09:17 )  WBC Count : 9.23 K/uL  RBC Count : 4.64 M/uL  Hemoglobin : 14.8 g/dL  Hematocrit : 41.3 %  Platelet Count - Automated : 371 K/uL  Mean Cell Volume : 89.0 fL  Mean Cell Hemoglobin : 31.9 pg  Mean Cell Hemoglobin Concentration : 35.8 %    04-14    135  |  98  |  57<H>  ----------------------------<  152<H>  3.6   |  22  |  1.64<H>    Ca    11.2<H>      14 Apr 2020 05:20  Phos  2.8     04-12  Mg     1.8     04-12    TPro  7.6  /  Alb  3.7  /  TBili  0.8  /  DBili  x   /  AST  20  /  ALT  50<H>  /  AlkPhos  61  04-13    COVID-19 PCR: Detected (03 Apr 2020 18:13)    IMPRESSION AND PLAN:  74F with DM type 2, HTN, hypothyroidism, CVA presenting with acute hypoxic respiratory failure/distress in the setting of SARS-CoV-2/COVID-19.    1) JAIMEE CoV-19 viral infection causing COVID-19 disease with wide ranging phenotype.  Acute hypoxic respiratory failure/distress; now resolved on RA saturating well.   Inflammatory markers including CRP; minimally elevated.   Imaging: CXR Patchy/hazy bilateral opacities. Pattern suggests COVID-19.    ·	Completed 5 day course of hydroxychloroquine 4/9.  ·	Completed 5 day course of IV methylprednisolone 40 mg BID 4/13.  ·	Continue anti-pyretic regimen of acetaminophen 650 mg q6h prn for temperature >100.4F.   ·	Continue supportive care with supplemental oxygenation to maintain a minimum of SpO2 >92% and trial down-titration when SpO2 >92%.   ·	For mild COVID-19 symptoms, started on prophylactic enoxaparin 40 mg daily or BID if BMI >30.     2) HTN   Well controlled.     ·	Continue medical management with HCTZ 25 mg daily, amlodipine 5 mg daily, and metoprolol succinate 25 mg daily.   ·	Holding losartan 100 mg daily given normal blood pressures; reassess outpatient.     3) Hypothyroid     ·	Continue medical management with levothyroxine 25 mcg daily.     4) DM   Diabetes uncontrolled.   Exacerbated by IV steroids     ·	Increase basal insulin and moderate intensity sliding scale.   ·	Resume home medications once steroid discontinued and upon discharge; outpatient follow up.     Acceptable for discharge and is able to maintain oxygen saturation SpO2 >90% at rest and with minimal exertion.     True Biswas MD, MPH, SENA, RPVI, FACC  Cardiovascular Specialist Attending  [facility]  C: [phone]   E: [email or web address]

## 2020-05-01 PROCEDURE — G9005: CPT

## 2020-06-01 ENCOUNTER — OUTPATIENT (OUTPATIENT)
Dept: OUTPATIENT SERVICES | Facility: HOSPITAL | Age: 75
LOS: 1 days | End: 2020-06-01

## 2020-06-05 NOTE — ED ADULT NURSE NOTE - CAS DISCH BELONGINGS RETURNED
Hospitalist Progress Note      PCP: No primary care provider on file. Date of Admission: 6/4/2020        Subjective: sleepy       Medications:  Reviewed    Infusion Medications   Scheduled Medications    calcium elemental  500 mg Oral Daily    vitamin D  5,000 Units Oral Daily    divalproex  250 mg Oral BID    metoprolol succinate  50 mg Oral Daily    budesonide-formoterol  2 puff Inhalation BID    therapeutic multivitamin-minerals  1 tablet Oral Daily    rivaroxaban  15 mg Oral Daily with breakfast    sodium chloride flush  10 mL Intravenous 2 times per day    metOLazone  5 mg Oral Once    cefTRIAXone (ROCEPHIN) IV  1 g Intravenous Q24H     PRN Meds: ipratropium-albuterol, sodium chloride flush, acetaminophen **OR** acetaminophen, magnesium hydroxide, promethazine **OR** ondansetron, potassium chloride      Intake/Output Summary (Last 24 hours) at 6/5/2020 1340  Last data filed at 6/5/2020 0545  Gross per 24 hour   Intake --   Output 1000 ml   Net -1000 ml       Physical Exam Performed:    /84   Pulse 71   Temp 94.2 °F (34.6 °C) (Rectal)   Resp 18   Wt 208 lb 12.4 oz (94.7 kg)   SpO2 96%     General appearance: No apparent distress  Neck: Supple  Respiratory:  Normal respiratory effort. Clear to auscultation, bilaterally without Rales/Wheezes/Rhonchi. Cardiovascular: Regular rate and rhythm with normal S1/S2 without murmurs, rubs or gallops. Abdomen: Soft, non-tender  Musculoskeletal: No clubbing, cyanosis   Skin: Skin color, texture, turgor normal.  No rashes or lesions.   Neurologic: moving her extremities   Psychiatric: confused  Capillary Refill: Brisk,< 3 seconds   Peripheral Pulses: +2 palpable, equal bilaterally       Labs:   Recent Labs     06/04/20 2009   WBC 6.6   HGB 12.4   HCT 39.4        Recent Labs     06/04/20 2009 06/05/20  0310    145   K 4.1 3.9    105   CO2 26 25   BUN 21* 21*   CREATININE 1.0 1.0   CALCIUM 9.6 10.1     Recent Labs Not applicable

## 2020-06-15 DIAGNOSIS — Z71.89 OTHER SPECIFIED COUNSELING: ICD-10-CM

## 2020-06-15 PROBLEM — E03.9 HYPOTHYROIDISM, UNSPECIFIED: Chronic | Status: ACTIVE | Noted: 2020-04-03

## 2020-06-15 PROBLEM — E11.9 TYPE 2 DIABETES MELLITUS WITHOUT COMPLICATIONS: Chronic | Status: ACTIVE | Noted: 2020-04-03

## 2020-06-15 PROBLEM — I10 ESSENTIAL (PRIMARY) HYPERTENSION: Chronic | Status: ACTIVE | Noted: 2020-04-03
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
## 2022-11-29 NOTE — ED ADULT TRIAGE NOTE - TEMPERATURE IN FAHRENHEIT (DEGREES F)

## 2022-12-12 NOTE — ASU PREOP CHECKLIST - HEART RATE (BEATS/MIN)
Patient Transferred to:  5  Handoff Report Given to: Alejandra Gay RN gave phone report to Alejandra LAM   67

## 2023-06-15 NOTE — H&P ADULT - NSICDXPASTSURGICALHX_GEN_ALL_CORE_FT
Medical Necessity Information: It is in your best interest to select a reason for this procedure from the list below. All of these items fulfill various CMS LCD requirements except the new and changing color options. Add 52 Modifier (Optional): no Consent: The patient's consent was obtained including but not limited to risks of crusting, scabbing, blistering, scarring, darker or lighter pigmentary change, recurrence, incomplete removal and infection. Detail Level: Detailed Show Topical Anesthesia Variable?: Yes Post-Care Instructions: I reviewed with the patient in detail post-care instructions. Patient is to wear sunprotection, and avoid picking at any of the treated lesions. Pt may apply Vaseline to crusted or scabbing areas. Spray Paint Text: The liquid nitrogen was applied to the skin utilizing a spray paint frosting technique. Medical Necessity Clause: This procedure was medically necessary because the lesions that were treated were: PAST SURGICAL HISTORY:  H/O total knee replacement, right 2014 August 12    No significant past surgical history     S/P bilateral cataract extraction     S/P vaginopexy

## 2023-08-10 NOTE — ED PROVIDER NOTE - CHIEF COMPLAINT
The patient is a 74y Female complaining of
Class III - visualization of the soft palate and the base of the uvula

## 2024-06-14 NOTE — PROGRESS NOTE ADULT - PROBLEM/PLAN-3
Bed: 36  Expected date:   Expected time:   Means of arrival:   Comments:  charge   DISPLAY PLAN FREE TEXT

## 2024-07-24 NOTE — PROGRESS NOTE ADULT - PROVIDER SPECIALTY LIST ADULT
Pain Medicine [FreeTextEntry1] : 69 year old woman with a PMH of diverticulitis presenting for bilateral lower extremity swelling and  painful varicose veins. Reports heaviness at the end of the day. No wounds. No camping. No skin changes.